# Patient Record
Sex: FEMALE | Race: WHITE | NOT HISPANIC OR LATINO | Employment: OTHER | URBAN - METROPOLITAN AREA
[De-identification: names, ages, dates, MRNs, and addresses within clinical notes are randomized per-mention and may not be internally consistent; named-entity substitution may affect disease eponyms.]

---

## 2018-01-22 ENCOUNTER — HOSPITAL ENCOUNTER (OUTPATIENT)
Dept: LAB | Facility: CLINIC | Age: 68
Discharge: HOME | End: 2018-01-22
Attending: ORTHOPAEDIC SURGERY | Admitting: ORTHOPAEDIC SURGERY

## 2018-01-22 DIAGNOSIS — Z01.812 PRE-OPERATIVE LABORATORY EXAMINATION: ICD-10-CM

## 2018-01-22 LAB
MRSA DNA SPEC QL NAA+PROBE: NEGATIVE
SPECIMEN SOURCE: NORMAL

## 2018-01-22 PROCEDURE — 40000829 ZZHCL STATISTIC STAPH AUREUS SUSCEPT SCREEN PCR: Performed by: ORTHOPAEDIC SURGERY

## 2018-01-22 PROCEDURE — 87640 STAPH A DNA AMP PROBE: CPT | Performed by: ORTHOPAEDIC SURGERY

## 2018-01-22 PROCEDURE — 87641 MR-STAPH DNA AMP PROBE: CPT | Performed by: ORTHOPAEDIC SURGERY

## 2018-01-22 PROCEDURE — 40000830 ZZHCL STATISTIC STAPH AUREUS METH RESIST SCREEN PCR: Performed by: ORTHOPAEDIC SURGERY

## 2018-01-24 ENCOUNTER — TRANSFERRED RECORDS (OUTPATIENT)
Dept: HEALTH INFORMATION MANAGEMENT | Facility: CLINIC | Age: 68
End: 2018-01-24

## 2018-02-15 RX ORDER — LISINOPRIL/HYDROCHLOROTHIAZIDE 10-12.5 MG
1 TABLET ORAL DAILY
COMMUNITY
End: 2023-02-10

## 2018-02-20 ENCOUNTER — HOSPITAL ENCOUNTER (INPATIENT)
Facility: CLINIC | Age: 68
LOS: 1 days | Discharge: HOME | DRG: 470 | End: 2018-02-21
Attending: ORTHOPAEDIC SURGERY | Admitting: ORTHOPAEDIC SURGERY
Payer: MEDICARE

## 2018-02-20 ENCOUNTER — ANESTHESIA (OUTPATIENT)
Dept: SURGERY | Facility: CLINIC | Age: 68
DRG: 470 | End: 2018-02-20
Payer: MEDICARE

## 2018-02-20 ENCOUNTER — ANESTHESIA EVENT (OUTPATIENT)
Dept: SURGERY | Facility: CLINIC | Age: 68
DRG: 470 | End: 2018-02-20
Payer: MEDICARE

## 2018-02-20 ENCOUNTER — APPOINTMENT (OUTPATIENT)
Dept: GENERAL RADIOLOGY | Facility: CLINIC | Age: 68
DRG: 470 | End: 2018-02-20
Attending: ORTHOPAEDIC SURGERY
Payer: MEDICARE

## 2018-02-20 DIAGNOSIS — Z96.651 STATUS POST TOTAL KNEE REPLACEMENT, RIGHT: Primary | ICD-10-CM

## 2018-02-20 LAB
CREAT SERPL-MCNC: 0.9 MG/DL (ref 0.52–1.04)
GFR SERPL CREATININE-BSD FRML MDRD: 62 ML/MIN/1.7M2
HGB BLD-MCNC: 12.2 G/DL (ref 11.7–15.7)
PLATELET # BLD AUTO: 195 10E9/L (ref 150–450)
POTASSIUM SERPL-SCNC: 3.6 MMOL/L (ref 3.4–5.3)

## 2018-02-20 PROCEDURE — 85049 AUTOMATED PLATELET COUNT: CPT | Performed by: PHYSICIAN ASSISTANT

## 2018-02-20 PROCEDURE — 40000986 XR KNEE PORT RT 1/2 VW: Mod: RT

## 2018-02-20 PROCEDURE — 40000170 ZZH STATISTIC PRE-PROCEDURE ASSESSMENT II: Performed by: ORTHOPAEDIC SURGERY

## 2018-02-20 PROCEDURE — A9270 NON-COVERED ITEM OR SERVICE: HCPCS | Mod: GY | Performed by: PHYSICIAN ASSISTANT

## 2018-02-20 PROCEDURE — 84132 ASSAY OF SERUM POTASSIUM: CPT | Performed by: PHYSICIAN ASSISTANT

## 2018-02-20 PROCEDURE — 71000012 ZZH RECOVERY PHASE 1 LEVEL 1 FIRST HR: Performed by: ORTHOPAEDIC SURGERY

## 2018-02-20 PROCEDURE — 25000128 H RX IP 250 OP 636: Performed by: ANESTHESIOLOGY

## 2018-02-20 PROCEDURE — 36000063 ZZH SURGERY LEVEL 4 EA 15 ADDTL MIN: Performed by: ORTHOPAEDIC SURGERY

## 2018-02-20 PROCEDURE — 85018 HEMOGLOBIN: CPT | Performed by: PHYSICIAN ASSISTANT

## 2018-02-20 PROCEDURE — 25000132 ZZH RX MED GY IP 250 OP 250 PS 637: Mod: GY | Performed by: PHYSICIAN ASSISTANT

## 2018-02-20 PROCEDURE — 25000128 H RX IP 250 OP 636: Performed by: NURSE ANESTHETIST, CERTIFIED REGISTERED

## 2018-02-20 PROCEDURE — 25000128 H RX IP 250 OP 636: Performed by: ORTHOPAEDIC SURGERY

## 2018-02-20 PROCEDURE — 99207 ZZC CONSULT E&M CHANGED TO SUBSEQUENT LEVEL: CPT | Performed by: NURSE PRACTITIONER

## 2018-02-20 PROCEDURE — C1776 JOINT DEVICE (IMPLANTABLE): HCPCS | Performed by: ORTHOPAEDIC SURGERY

## 2018-02-20 PROCEDURE — 25000132 ZZH RX MED GY IP 250 OP 250 PS 637: Mod: GY | Performed by: NURSE PRACTITIONER

## 2018-02-20 PROCEDURE — 37000009 ZZH ANESTHESIA TECHNICAL FEE, EACH ADDTL 15 MIN: Performed by: ORTHOPAEDIC SURGERY

## 2018-02-20 PROCEDURE — A9270 NON-COVERED ITEM OR SERVICE: HCPCS | Mod: GY | Performed by: NURSE PRACTITIONER

## 2018-02-20 PROCEDURE — 25000125 ZZHC RX 250: Performed by: ORTHOPAEDIC SURGERY

## 2018-02-20 PROCEDURE — 37000008 ZZH ANESTHESIA TECHNICAL FEE, 1ST 30 MIN: Performed by: ORTHOPAEDIC SURGERY

## 2018-02-20 PROCEDURE — 25000125 ZZHC RX 250: Performed by: ANESTHESIOLOGY

## 2018-02-20 PROCEDURE — 27210794 ZZH OR GENERAL SUPPLY STERILE: Performed by: ORTHOPAEDIC SURGERY

## 2018-02-20 PROCEDURE — 82565 ASSAY OF CREATININE: CPT | Performed by: PHYSICIAN ASSISTANT

## 2018-02-20 PROCEDURE — 36415 COLL VENOUS BLD VENIPUNCTURE: CPT | Performed by: PHYSICIAN ASSISTANT

## 2018-02-20 PROCEDURE — 0SRC0J9 REPLACEMENT OF RIGHT KNEE JOINT WITH SYNTHETIC SUBSTITUTE, CEMENTED, OPEN APPROACH: ICD-10-PCS | Performed by: ORTHOPAEDIC SURGERY

## 2018-02-20 PROCEDURE — 99232 SBSQ HOSP IP/OBS MODERATE 35: CPT | Performed by: NURSE PRACTITIONER

## 2018-02-20 PROCEDURE — 25000125 ZZHC RX 250: Performed by: NURSE ANESTHETIST, CERTIFIED REGISTERED

## 2018-02-20 PROCEDURE — 25000125 ZZHC RX 250: Performed by: PHYSICIAN ASSISTANT

## 2018-02-20 PROCEDURE — 25000132 ZZH RX MED GY IP 250 OP 250 PS 637: Mod: GY | Performed by: ORTHOPAEDIC SURGERY

## 2018-02-20 PROCEDURE — 12000007 ZZH R&B INTERMEDIATE

## 2018-02-20 PROCEDURE — 25800025 ZZH RX 258: Performed by: ORTHOPAEDIC SURGERY

## 2018-02-20 PROCEDURE — 25000128 H RX IP 250 OP 636: Performed by: PHYSICIAN ASSISTANT

## 2018-02-20 PROCEDURE — 27210995 ZZH RX 272: Performed by: ORTHOPAEDIC SURGERY

## 2018-02-20 PROCEDURE — 36000093 ZZH SURGERY LEVEL 4 1ST 30 MIN: Performed by: ORTHOPAEDIC SURGERY

## 2018-02-20 PROCEDURE — A9270 NON-COVERED ITEM OR SERVICE: HCPCS | Mod: GY | Performed by: ORTHOPAEDIC SURGERY

## 2018-02-20 PROCEDURE — 27810169 ZZH OR IMPLANT GENERAL: Performed by: ORTHOPAEDIC SURGERY

## 2018-02-20 DEVICE — BONE CEMENT RADIOPAQUE SIMPLEX HV FULL DOSE 6194-1-001: Type: IMPLANTABLE DEVICE | Site: KNEE | Status: FUNCTIONAL

## 2018-02-20 DEVICE — IMP BONE CEMENT SIMPLEX W/GENTAMICIN 6195-1-001: Type: IMPLANTABLE DEVICE | Site: KNEE | Status: FUNCTIONAL

## 2018-02-20 DEVICE — IMPLANTABLE DEVICE: Type: IMPLANTABLE DEVICE | Site: KNEE | Status: FUNCTIONAL

## 2018-02-20 DEVICE — IMP COMP FEM JJ ATTUNE POST STAB RT NRW CEM SZ5 150410225: Type: IMPLANTABLE DEVICE | Site: KNEE | Status: FUNCTIONAL

## 2018-02-20 RX ORDER — HYDROMORPHONE HYDROCHLORIDE 1 MG/ML
.3-.5 INJECTION, SOLUTION INTRAMUSCULAR; INTRAVENOUS; SUBCUTANEOUS EVERY 5 MIN PRN
Status: DISCONTINUED | OUTPATIENT
Start: 2018-02-20 | End: 2018-02-20 | Stop reason: HOSPADM

## 2018-02-20 RX ORDER — CEFAZOLIN SODIUM 1 G
1 VIAL (EA) INJECTION EVERY 8 HOURS
Status: COMPLETED | OUTPATIENT
Start: 2018-02-20 | End: 2018-02-21

## 2018-02-20 RX ORDER — CEFAZOLIN SODIUM 1 G
1 VIAL (EA) INJECTION SEE ADMIN INSTRUCTIONS
Status: DISCONTINUED | OUTPATIENT
Start: 2018-02-20 | End: 2018-02-20 | Stop reason: HOSPADM

## 2018-02-20 RX ORDER — ONDANSETRON 2 MG/ML
INJECTION INTRAMUSCULAR; INTRAVENOUS PRN
Status: DISCONTINUED | OUTPATIENT
Start: 2018-02-20 | End: 2018-02-20

## 2018-02-20 RX ORDER — KETOROLAC TROMETHAMINE 15 MG/ML
15 INJECTION, SOLUTION INTRAMUSCULAR; INTRAVENOUS EVERY 6 HOURS
Status: COMPLETED | OUTPATIENT
Start: 2018-02-20 | End: 2018-02-21

## 2018-02-20 RX ORDER — MAGNESIUM OXIDE 400 MG/1
400 TABLET ORAL 2 TIMES DAILY
Status: DISCONTINUED | OUTPATIENT
Start: 2018-02-20 | End: 2018-02-21 | Stop reason: HOSPADM

## 2018-02-20 RX ORDER — BUPIVACAINE HYDROCHLORIDE 7.5 MG/ML
INJECTION, SOLUTION INTRASPINAL PRN
Status: DISCONTINUED | OUTPATIENT
Start: 2018-02-20 | End: 2018-02-20

## 2018-02-20 RX ORDER — CEFAZOLIN SODIUM 2 G/100ML
2 INJECTION, SOLUTION INTRAVENOUS
Status: COMPLETED | OUTPATIENT
Start: 2018-02-20 | End: 2018-02-20

## 2018-02-20 RX ORDER — ACETAMINOPHEN 325 MG/1
975 TABLET ORAL EVERY 8 HOURS
Status: DISCONTINUED | OUTPATIENT
Start: 2018-02-20 | End: 2018-02-21 | Stop reason: HOSPADM

## 2018-02-20 RX ORDER — LISINOPRIL 10 MG/1
10 TABLET ORAL DAILY
Status: DISCONTINUED | OUTPATIENT
Start: 2018-02-21 | End: 2018-02-21 | Stop reason: HOSPADM

## 2018-02-20 RX ORDER — AMOXICILLIN 250 MG
1 CAPSULE ORAL 2 TIMES DAILY PRN
Status: DISCONTINUED | OUTPATIENT
Start: 2018-02-20 | End: 2018-02-21 | Stop reason: HOSPADM

## 2018-02-20 RX ORDER — ONDANSETRON 4 MG/1
4 TABLET, ORALLY DISINTEGRATING ORAL EVERY 30 MIN PRN
Status: DISCONTINUED | OUTPATIENT
Start: 2018-02-20 | End: 2018-02-20 | Stop reason: HOSPADM

## 2018-02-20 RX ORDER — GABAPENTIN 300 MG/1
300 CAPSULE ORAL EVERY EVENING
Status: DISCONTINUED | OUTPATIENT
Start: 2018-02-20 | End: 2018-02-21 | Stop reason: HOSPADM

## 2018-02-20 RX ORDER — TEMAZEPAM 7.5 MG/1
7.5 CAPSULE ORAL
Status: DISCONTINUED | OUTPATIENT
Start: 2018-02-21 | End: 2018-02-21 | Stop reason: HOSPADM

## 2018-02-20 RX ORDER — DEXTROSE MONOHYDRATE, SODIUM CHLORIDE, AND POTASSIUM CHLORIDE 50; 1.49; 4.5 G/1000ML; G/1000ML; G/1000ML
INJECTION, SOLUTION INTRAVENOUS CONTINUOUS
Status: DISCONTINUED | OUTPATIENT
Start: 2018-02-20 | End: 2018-02-20

## 2018-02-20 RX ORDER — SODIUM CHLORIDE, SODIUM LACTATE, POTASSIUM CHLORIDE, CALCIUM CHLORIDE 600; 310; 30; 20 MG/100ML; MG/100ML; MG/100ML; MG/100ML
INJECTION, SOLUTION INTRAVENOUS CONTINUOUS
Status: DISCONTINUED | OUTPATIENT
Start: 2018-02-20 | End: 2018-02-20 | Stop reason: HOSPADM

## 2018-02-20 RX ORDER — ASCORBIC ACID 500 MG
1000 TABLET ORAL 2 TIMES DAILY
Status: DISCONTINUED | OUTPATIENT
Start: 2018-02-20 | End: 2018-02-21 | Stop reason: HOSPADM

## 2018-02-20 RX ORDER — LIDOCAINE 40 MG/G
CREAM TOPICAL
Status: DISCONTINUED | OUTPATIENT
Start: 2018-02-20 | End: 2018-02-21 | Stop reason: HOSPADM

## 2018-02-20 RX ORDER — AMOXICILLIN 250 MG
2 CAPSULE ORAL 2 TIMES DAILY PRN
Status: DISCONTINUED | OUTPATIENT
Start: 2018-02-20 | End: 2018-02-21 | Stop reason: HOSPADM

## 2018-02-20 RX ORDER — HYDROXYZINE HYDROCHLORIDE 10 MG/1
10 TABLET, FILM COATED ORAL EVERY 6 HOURS PRN
Status: DISCONTINUED | OUTPATIENT
Start: 2018-02-20 | End: 2018-02-21 | Stop reason: HOSPADM

## 2018-02-20 RX ORDER — SCOLOPAMINE TRANSDERMAL SYSTEM 1 MG/1
1 PATCH, EXTENDED RELEASE TRANSDERMAL ONCE
Status: COMPLETED | OUTPATIENT
Start: 2018-02-20 | End: 2018-02-20

## 2018-02-20 RX ORDER — LIDOCAINE HYDROCHLORIDE 20 MG/ML
INJECTION, SOLUTION INFILTRATION; PERINEURAL PRN
Status: DISCONTINUED | OUTPATIENT
Start: 2018-02-20 | End: 2018-02-20

## 2018-02-20 RX ORDER — ACETAMINOPHEN 325 MG/1
650 TABLET ORAL EVERY 4 HOURS PRN
Status: DISCONTINUED | OUTPATIENT
Start: 2018-02-23 | End: 2018-02-21 | Stop reason: HOSPADM

## 2018-02-20 RX ORDER — FENTANYL CITRATE 50 UG/ML
INJECTION, SOLUTION INTRAMUSCULAR; INTRAVENOUS PRN
Status: DISCONTINUED | OUTPATIENT
Start: 2018-02-20 | End: 2018-02-20

## 2018-02-20 RX ORDER — ACETAMINOPHEN 500 MG
1000 TABLET ORAL ONCE
Status: COMPLETED | OUTPATIENT
Start: 2018-02-20 | End: 2018-02-20

## 2018-02-20 RX ORDER — PROCHLORPERAZINE MALEATE 5 MG
5 TABLET ORAL EVERY 6 HOURS PRN
Status: DISCONTINUED | OUTPATIENT
Start: 2018-02-20 | End: 2018-02-21 | Stop reason: HOSPADM

## 2018-02-20 RX ORDER — NALOXONE HYDROCHLORIDE 0.4 MG/ML
.1-.4 INJECTION, SOLUTION INTRAMUSCULAR; INTRAVENOUS; SUBCUTANEOUS
Status: DISCONTINUED | OUTPATIENT
Start: 2018-02-20 | End: 2018-02-21 | Stop reason: HOSPADM

## 2018-02-20 RX ORDER — ONDANSETRON 2 MG/ML
4 INJECTION INTRAMUSCULAR; INTRAVENOUS EVERY 6 HOURS PRN
Status: DISCONTINUED | OUTPATIENT
Start: 2018-02-20 | End: 2018-02-21 | Stop reason: HOSPADM

## 2018-02-20 RX ORDER — BUPIVACAINE HYDROCHLORIDE AND EPINEPHRINE 5; 5 MG/ML; UG/ML
INJECTION, SOLUTION PERINEURAL PRN
Status: DISCONTINUED | OUTPATIENT
Start: 2018-02-20 | End: 2018-02-20 | Stop reason: HOSPADM

## 2018-02-20 RX ORDER — PROPOFOL 10 MG/ML
INJECTION, EMULSION INTRAVENOUS CONTINUOUS PRN
Status: DISCONTINUED | OUTPATIENT
Start: 2018-02-20 | End: 2018-02-20

## 2018-02-20 RX ORDER — FENTANYL CITRATE 50 UG/ML
25-50 INJECTION, SOLUTION INTRAMUSCULAR; INTRAVENOUS
Status: DISCONTINUED | OUTPATIENT
Start: 2018-02-20 | End: 2018-02-20 | Stop reason: HOSPADM

## 2018-02-20 RX ORDER — KETOROLAC TROMETHAMINE 30 MG/ML
INJECTION, SOLUTION INTRAMUSCULAR; INTRAVENOUS PRN
Status: DISCONTINUED | OUTPATIENT
Start: 2018-02-20 | End: 2018-02-20 | Stop reason: HOSPADM

## 2018-02-20 RX ORDER — ONDANSETRON 2 MG/ML
4 INJECTION INTRAMUSCULAR; INTRAVENOUS EVERY 30 MIN PRN
Status: DISCONTINUED | OUTPATIENT
Start: 2018-02-20 | End: 2018-02-20 | Stop reason: HOSPADM

## 2018-02-20 RX ORDER — HYDROMORPHONE HYDROCHLORIDE 2 MG/1
2-4 TABLET ORAL
Status: DISCONTINUED | OUTPATIENT
Start: 2018-02-20 | End: 2018-02-21 | Stop reason: HOSPADM

## 2018-02-20 RX ORDER — ONDANSETRON 4 MG/1
4 TABLET, ORALLY DISINTEGRATING ORAL EVERY 6 HOURS PRN
Status: DISCONTINUED | OUTPATIENT
Start: 2018-02-20 | End: 2018-02-21 | Stop reason: HOSPADM

## 2018-02-20 RX ORDER — HYDROMORPHONE HYDROCHLORIDE 1 MG/ML
.3-.5 INJECTION, SOLUTION INTRAMUSCULAR; INTRAVENOUS; SUBCUTANEOUS
Status: DISCONTINUED | OUTPATIENT
Start: 2018-02-20 | End: 2018-02-21 | Stop reason: HOSPADM

## 2018-02-20 RX ADMIN — SCOPALAMINE 1 PATCH: 1 PATCH, EXTENDED RELEASE TRANSDERMAL at 11:02

## 2018-02-20 RX ADMIN — HYDROMORPHONE HYDROCHLORIDE 4 MG: 2 TABLET ORAL at 20:48

## 2018-02-20 RX ADMIN — Medication 400 MG: at 20:48

## 2018-02-20 RX ADMIN — ROPIVACAINE HYDROCHLORIDE 25 ML GIVEN: 5 INJECTION, SOLUTION EPIDURAL; INFILTRATION; PERINEURAL at 11:03

## 2018-02-20 RX ADMIN — CEFAZOLIN SODIUM 1 G: 2 INJECTION, SOLUTION INTRAVENOUS at 13:45

## 2018-02-20 RX ADMIN — TRANEXAMIC ACID 1 G: 100 INJECTION, SOLUTION INTRAVENOUS at 11:50

## 2018-02-20 RX ADMIN — HYDROMORPHONE HYDROCHLORIDE 0.5 MG: 1 INJECTION, SOLUTION INTRAMUSCULAR; INTRAVENOUS; SUBCUTANEOUS at 19:54

## 2018-02-20 RX ADMIN — BUPIVACAINE HYDROCHLORIDE IN DEXTROSE 12 MG: 7.5 INJECTION, SOLUTION SUBARACHNOID at 11:37

## 2018-02-20 RX ADMIN — PHENYLEPHRINE HYDROCHLORIDE 100 MCG: 10 INJECTION INTRAVENOUS at 11:56

## 2018-02-20 RX ADMIN — ONDANSETRON 4 MG: 2 INJECTION INTRAMUSCULAR; INTRAVENOUS at 14:00

## 2018-02-20 RX ADMIN — PROPOFOL 100 MCG/KG/MIN: 10 INJECTION, EMULSION INTRAVENOUS at 11:36

## 2018-02-20 RX ADMIN — SODIUM CHLORIDE, POTASSIUM CHLORIDE, SODIUM LACTATE AND CALCIUM CHLORIDE: 600; 310; 30; 20 INJECTION, SOLUTION INTRAVENOUS at 10:43

## 2018-02-20 RX ADMIN — OXYCODONE HYDROCHLORIDE AND ACETAMINOPHEN 1000 MG: 500 TABLET ORAL at 20:48

## 2018-02-20 RX ADMIN — KETOROLAC TROMETHAMINE 15 MG: 15 INJECTION, SOLUTION INTRAMUSCULAR; INTRAVENOUS at 20:48

## 2018-02-20 RX ADMIN — PHENYLEPHRINE HYDROCHLORIDE 100 MCG: 10 INJECTION INTRAVENOUS at 12:34

## 2018-02-20 RX ADMIN — MIDAZOLAM 1 MG: 1 INJECTION INTRAMUSCULAR; INTRAVENOUS at 11:34

## 2018-02-20 RX ADMIN — FENTANYL CITRATE 25 MCG: 50 INJECTION, SOLUTION INTRAMUSCULAR; INTRAVENOUS at 11:34

## 2018-02-20 RX ADMIN — ACETAMINOPHEN 975 MG: 325 TABLET, FILM COATED ORAL at 17:34

## 2018-02-20 RX ADMIN — PHENYLEPHRINE HYDROCHLORIDE 100 MCG: 10 INJECTION INTRAVENOUS at 12:42

## 2018-02-20 RX ADMIN — PHENYLEPHRINE HYDROCHLORIDE 100 MCG: 10 INJECTION INTRAVENOUS at 13:02

## 2018-02-20 RX ADMIN — HYDROMORPHONE HYDROCHLORIDE 0.5 MG: 1 INJECTION, SOLUTION INTRAMUSCULAR; INTRAVENOUS; SUBCUTANEOUS at 17:34

## 2018-02-20 RX ADMIN — CEFAZOLIN SODIUM 2 G: 2 INJECTION, SOLUTION INTRAVENOUS at 11:45

## 2018-02-20 RX ADMIN — MIDAZOLAM 1 MG: 1 INJECTION INTRAMUSCULAR; INTRAVENOUS at 11:40

## 2018-02-20 RX ADMIN — TRANEXAMIC ACID 1 G: 100 INJECTION, SOLUTION INTRAVENOUS at 13:34

## 2018-02-20 RX ADMIN — PHENYLEPHRINE HYDROCHLORIDE 100 MCG: 10 INJECTION INTRAVENOUS at 13:19

## 2018-02-20 RX ADMIN — MIDAZOLAM HYDROCHLORIDE 1 MG: 1 INJECTION, SOLUTION INTRAMUSCULAR; INTRAVENOUS at 11:01

## 2018-02-20 RX ADMIN — GABAPENTIN 300 MG: 300 CAPSULE ORAL at 19:54

## 2018-02-20 RX ADMIN — CEFAZOLIN SODIUM 1 G: 10 INJECTION, POWDER, FOR SOLUTION INTRAVENOUS at 21:05

## 2018-02-20 RX ADMIN — ACETAMINOPHEN 1000 MG: 500 TABLET, FILM COATED ORAL at 10:43

## 2018-02-20 RX ADMIN — LIDOCAINE HYDROCHLORIDE 40 MG: 20 INJECTION, SOLUTION INFILTRATION; PERINEURAL at 11:36

## 2018-02-20 RX ADMIN — PHENYLEPHRINE HYDROCHLORIDE 100 MCG: 10 INJECTION INTRAVENOUS at 12:22

## 2018-02-20 RX ADMIN — PHENYLEPHRINE HYDROCHLORIDE 100 MCG: 10 INJECTION INTRAVENOUS at 12:14

## 2018-02-20 RX ADMIN — KETOROLAC TROMETHAMINE 15 MG: 15 INJECTION, SOLUTION INTRAMUSCULAR; INTRAVENOUS at 14:59

## 2018-02-20 RX ADMIN — PHENYLEPHRINE HYDROCHLORIDE 100 MCG: 10 INJECTION INTRAVENOUS at 12:06

## 2018-02-20 ASSESSMENT — ACTIVITIES OF DAILY LIVING (ADL)
BATHING: 0-->INDEPENDENT
RETIRED_COMMUNICATION: 0-->UNDERSTANDS/COMMUNICATES WITHOUT DIFFICULTY
RETIRED_EATING: 0-->INDEPENDENT
TRANSFERRING: 0-->INDEPENDENT
SWALLOWING: 0-->SWALLOWS FOODS/LIQUIDS WITHOUT DIFFICULTY
COGNITION: 0 - NO COGNITION ISSUES REPORTED
DRESS: 0-->INDEPENDENT
BATHING: 0 - INDEPENDENT
CURRENT_FUNCTIONAL_LEVEL_COMMENT: INDEPENDENT
DRESS: 0 - INDEPENDENT
EATING: 0 - INDEPENDENT
TOILETING: 0 - INDEPENDENT
CHANGE_IN_FUNCTIONAL_STATUS_SINCE_ONSET_OF_CURRENT_ILLNESS/INJURY: YES
AMBULATION: 0-->INDEPENDENT
AMBULATION: 0 - INDEPENDENT
TOILETING: 0-->INDEPENDENT
FALL_HISTORY_WITHIN_LAST_SIX_MONTHS: NO
SWALLOWING: 0 - SWALLOWS FOODS/LIQUIDS WITHOUT DIFFICULTY
TRANSFERRING: 0 - INDEPENDENT
COMMUNICATION: 0 - UNDERSTANDS/COMMUNICATES WITHOUT DIFFICULTY

## 2018-02-20 NOTE — ANESTHESIA CARE TRANSFER NOTE
Patient: Shahrzad Myles    Procedure(s):  RIGHT TOTAL KNEE ARTHROPLASTY  - Wound Class: I-Clean    Diagnosis: RIGHT KNEE DJD   Diagnosis Additional Information: No value filed.    Anesthesia Type:   Spinal, Periph. Nerve Block for postop pain     Note:  Airway :Nasal Cannula  Patient transferred to:PACU  Comments: VSS. Airway and IV patent. Patient comfortable. Report to RN. Stable care transfer.Handoff Report: Identifed the Patient, Identified the Reponsible Provider, Reviewed the pertinent medical history, Discussed the surgical course, Reviewed Intra-OP anesthesia mangement and issues during anesthesia, Set expectations for post-procedure period and Allowed opportunity for questions and acknowledgement of understanding      Vitals: (Last set prior to Anesthesia Care Transfer)    CRNA VITALS  2/20/2018 1336 - 2/20/2018 1411      2/20/2018             Resp Rate (set): 10                Electronically Signed By: NATALIE Valle CRNA  February 20, 2018  2:11 PM

## 2018-02-20 NOTE — ANESTHESIA PREPROCEDURE EVALUATION
Anesthesia Evaluation     .             ROS/MED HX    ENT/Pulmonary:     (+)sleep apnea, uses CPAP , . .    Neurologic:     (+)migraines,     Cardiovascular:     (+) hypertension----. : . . . :. .       METS/Exercise Tolerance:     Hematologic:         Musculoskeletal:   (+) , , other musculoskeletal- TMJ;      GI/Hepatic:        (-) GERD   Renal/Genitourinary:         Endo:         Psychiatric:     (+) psychiatric history anxiety      Infectious Disease:         Malignancy:         Other:                     Physical Exam  Normal systems: cardiovascular, pulmonary and dental    Airway   Mallampati: II  TM distance: >3 FB  Neck ROM: full    Dental     Cardiovascular   Rhythm and rate: regular      Pulmonary    breath sounds clear to auscultation                    Anesthesia Plan      History & Physical Review  History and physical reviewed and following examination; no interval change.    ASA Status:  2 .    NPO Status:  > 8 hours    Plan for Spinal and Periph. Nerve Block for postop pain   PONV prophylaxis:  Ondansetron (or other 5HT-3) and Scopolamine patch       Postoperative Care  Postoperative pain management:  IV analgesics and Peripheral nerve block (Single Shot).      Consents  Anesthetic plan, risks, benefits and alternatives discussed with:  Patient..                          .

## 2018-02-20 NOTE — PROGRESS NOTES
Admission medication history interview status for the 2/20/2018  admission is complete. See EPIC admission navigator for prior to admission medications     Medication history source reliability:Good    Medication history interview source(s):Patient    Medication history resources (including written lists, pill bottles, clinic record): written list    Primary pharmacy. [facility]    Additional medication history information not noted on PTA med list : Pt brought in own compounded meds and eye drops    Time spent in this activity: 45 mins    Prior to Admission medications    Medication Sig Last Dose Taking? Auth Provider   OVER-THE-COUNTER Place 2 drops into both eyes every morning (Replens eye drops) 2/20/2018 at 0745 Yes Reported, Patient   lisinopril-hydrochlorothiazide (PRINZIDE/ZESTORETIC) 10-12.5 MG per tablet Take 1 tablet by mouth daily 2/19/2018 at 0900 Yes Reported, Patient   GABAPENTIN PO Take 300 mg by mouth every evening 2/19/2018 at 2100 Yes Reported, Patient   NONFORMULARY Take 300 mg by mouth every evening Compounded Progesterone  (3 x 100mg tablet = 300mg dose) 2/19/2018 at 2100 Yes Reported, Patient   NONFORMULARY Apply 0.125 teaspoonful topically every evening Compounded Estradiol Cream 4mg/GM 2/19/2018 at 2100 Yes Reported, Patient   IBUPROFEN PO Take 800 mg by mouth 3 times daily 2/12/2018 Yes Reported, Patient   Ascorbic Acid (VITAMIN C PO) Take 1,000 mg by mouth 2 times daily 2/19/2018 at 2100 Yes Reported, Patient   MAGNESIUM PO Take 1,500 mg by mouth every morning (Takes 3 x 500mg tablet = 1500mg dose) 2/19/2018 at 0900 Yes Reported, Patient   TURMERIC PO Take 500 mg by mouth 2 times daily 2/6/2018 Yes Reported, Patient   GLUCOSAMINE-CHONDROITIN PO Take 1 tablet by mouth 2 times daily 2/12/2018 Yes Reported, Patient   CRANBERRY PO Take 1 tablet by mouth every morning 2/19/2018 at 0900 Yes Reported, Patient   Probiotic Product (ALIGN PO) Take 1 tablet by mouth every morning  2/19/2018 at 0900 Yes Reported, Patient   Cyanocobalamin (VITAMIN B-12 PO) Take 5,000 mcg by mouth every morning 2/19/2018 at 0900 Yes Reported, Patient   Multiple Vitamins-Minerals (MULTIVITAMIN PO) Take 1 tablet by mouth every morning 2/19/2018 at 0900 Yes Reported, Patient   conjugated estrogens (PREMARIN) cream Place vaginally every evening 2/19/2018 at 2100 Yes Reported, Patient   GTF CHROMIUM PO Take 1,000 mcg by mouth daily (Takes 5 x 200mcg tablet = 1000mcg dose) 2/19/2018 at 0900 Yes Reported, Patient   Omega-3 Fatty Acids (FISH OIL PO) Take 2 capsules by mouth daily 2/12/2018 Yes Reported, Patient

## 2018-02-20 NOTE — BRIEF OP NOTE
[facility] Brief Operative Note    Pre-operative diagnosis: RIGHT KNEE DJD    Post-operative diagnosis same   Procedure: Procedure(s):  RIGHT TOTAL KNEE ARTHROPLASTY  - Wound Class: I-Clean   Surgeon(s): Surgeon(s) and Role:     * Jatinder Mcfarland MD - Primary     * Padmini Harper PA-C   Estimated blood loss: 100mL    Specimens: * No specimens in log *   Findings: Advanced OA     Plan: DC home pod2 w/daughter.  DVT prophylaxis w/lovenox in hospital, DC home on ASA 325mg BID x1mo.

## 2018-02-20 NOTE — IP AVS SNAPSHOT
[address] [facility]    [address] [place] [address]    Phone:  [phone]                                       After Visit Summary   2/20/2018    Shahrzad Myles    MRN: [id number]           After Visit Summary Signature Page     I have received my discharge instructions, and my questions have been answered. I have discussed any challenges I see with this plan with the nurse or doctor.    ..........................................................................................................................................  Patient/Patient Representative Signature      ..........................................................................................................................................  Patient Representative Print Name and Relationship to Patient    ..................................................               ................................................  Date                                            Time    ..........................................................................................................................................  Reviewed by Signature/Title    ...................................................              ..............................................  Date                                                            Time

## 2018-02-20 NOTE — CONSULTS
[facility]    Hospitalist Consultation    Date of Admission:  2/20/2018    Assessment & Plan   Shahrzad Myles is a 67 year old female with a past medical history of hypertension who was admitted on 2/20/2018 for a right total knee arthroplasty performed by Dr. Mcfarland under spinal and peripheral nerve block with an EBL of 100mL. The hospitalist service was asked to see the patient for medical comanagement.    Summary:   S/p right knee arthroplasty  -- continue routine post of care    HTN - patient is well managed on lisinopril-hctz  -- will hold the HCTZ  -- continue PTA 10mg lisinopril in the AM    Post menopausal - patient takes compounded estradiol/progesterone  -- continue PTA estradiol/progesterone, patient supplied medication    Constipation - patient takes magnesium and vitamin C at home for relief of constipation  -- continue PTA mag and vit C    Pain # Pain Assessment: Per primary team   Active Diet Order: Advance Diet as Tolerated: Regular Diet Adult  Fluids:  --> d/c'd as patient was eating when I examined her.  DVT Prophylaxis: Defer to primary service  Code Status: Full Code  Disposition: Expected discharge per primary team    This patient was discussed with KRYSTAL GOINS MD of the Hospitalist Service, who independently examined the patient. He is in agreement with the above plan.    We, of the Hospitalist Service, thank you for this consult. The patient is medically stable. Hospitalist Service will sign off. Please do not hesitate to call if additional concerns arise.    NATALIE Obrien, CNP  Text Page  (3pm to 11pm)  Reason for Consult   Reason for consult: I was asked by Jatinder Mcfarland MD to evaluate this patient for medical comanagement.    Primary Care Physician   Radha Granados    Chief Complaint   RKA    History is obtained from the patient, family, and medical record    History of Present Illness   Shahrzad Myles is a 67 year old female with a past medical  history of hypertension who was admitted on 2/20/2018 for a right total knee arthroplasty performed by Dr. Mcfarland under spinal and peripheral nerve block with an EBL of 100mL. The hospitalist service was asked to see the patient for medical comanagement.    Past Medical History    I have reviewed this patient's medical history and updated it with pertinent information if needed.   Past Medical History:   Diagnosis Date     HTN (hypertension)        Past Surgical History   I have reviewed this patient's surgical history and updated it with pertinent information if needed.  Past Surgical History:   Procedure Laterality Date     ARTHROPLASTY KNEE Left        Prior to Admission Medications   Prior to Admission Medications   Prescriptions Last Dose Informant Patient Reported? Taking?   Ascorbic Acid (VITAMIN C PO) 2/19/2018 at 2100 Self Yes Yes   Sig: Take 1,000 mg by mouth 2 times daily   CRANBERRY PO 2/19/2018 at 0900 Self Yes Yes   Sig: Take 1 tablet by mouth every morning   Cyanocobalamin (VITAMIN B-12 PO) 2/19/2018 at 0900 Self Yes Yes   Sig: Take 5,000 mcg by mouth every morning   GABAPENTIN PO 2/19/2018 at 2100 Self Yes Yes   Sig: Take 300 mg by mouth every evening   GLUCOSAMINE-CHONDROITIN PO 2/12/2018 Self Yes Yes   Sig: Take 1 tablet by mouth 2 times daily   GTF CHROMIUM PO 2/19/2018 at 0900 Self Yes Yes   Sig: Take 1,000 mcg by mouth daily (Takes 5 x 200mcg tablet = 1000mcg dose)   IBUPROFEN PO 2/12/2018 Self Yes Yes   Sig: Take 800 mg by mouth 3 times daily   MAGNESIUM PO 2/19/2018 at 0900 Self Yes Yes   Sig: Take 1,500 mg by mouth every morning (Takes 3 x 500mg tablet = 1500mg dose)   Multiple Vitamins-Minerals (MULTIVITAMIN PO) 2/19/2018 at 0900 Self Yes Yes   Sig: Take 1 tablet by mouth every morning   NONFORMULARY 2/19/2018 at 2100 Self Yes Yes   Sig: Take 300 mg by mouth every evening Compounded Progesterone  (3 x 100mg tablet = 300mg dose)   NONFORMULARY 2/19/2018 at 2100 Self Yes Yes   Sig: Apply 0.125  teaspoonful topically every evening Compounded Estradiol Cream 4mg/GM   OVER-THE-COUNTER 2/20/2018 at 0745 Self Yes Yes   Sig: Place 2 drops into both eyes every morning (Replens eye drops)   Omega-3 Fatty Acids (FISH OIL PO) 2/12/2018 Self Yes Yes   Sig: Take 2 capsules by mouth daily   Probiotic Product (ALIGN PO) 2/19/2018 at 0900 Self Yes Yes   Sig: Take 1 tablet by mouth every morning   TURMERIC PO 2/6/2018 Self Yes Yes   Sig: Take 500 mg by mouth 2 times daily   conjugated estrogens (PREMARIN) cream 2/19/2018 at 2100 Self Yes Yes   Sig: Place vaginally every evening   lisinopril-hydrochlorothiazide (PRINZIDE/ZESTORETIC) 10-12.5 MG per tablet 2/19/2018 at 0900 Self Yes Yes   Sig: Take 1 tablet by mouth daily      Facility-Administered Medications: None     Allergies   Allergies   Allergen Reactions     Codeine      Penicillin G      Sulfa Drugs      Tape [Adhesive Tape]        Social History   I have reviewed this patient's social history and updated it with pertinent information if needed. Shahrzad Myles  reports that she has never smoked. She has never used smokeless tobacco. She reports that she drinks alcohol. She reports that she does not use illicit drugs.    Family History   I have reviewed this patient's family history and updated it with pertinent information if needed.   Family History   Problem Relation Age of Onset     Hypertension Mother      Hyperlipidemia Mother      DIABETES Father      Heart Surgery Father      CEREBROVASCULAR DISEASE Father        Review of Systems   The 10 point Review of Systems is negative other than noted in the HPI or here.     Physical Exam   Temp: 98.6  F (37  C) Temp src: Oral BP: 126/67 Pulse: 66 Heart Rate: 65 Resp: 16 SpO2: 98 % O2 Device: None (Room air) Oxygen Delivery: 2 LPM  Vital Signs with Ranges  Temp:  [97  F (36.1  C)-98.6  F (37  C)] 98.6  F (37  C)  Pulse:  [64-66] 66  Heart Rate:  [49-76] 65  Resp:  [10-19] 16  BP: (116-151)/(58-74) 126/67  SpO2:  [97  %-99 %] 98 %  162 lbs 0 oz    Constitutional: Awake, alert, cooperative, no apparent distress.  HEENT: Moist mucous membranes, normal dentition, conjunctiva and pupils examined and normal.  Respiratory: Clear to auscultation bilaterally, no crackles or wheezing.  Cardiovascular: Regular rate and rhythm, normal S1 and S2, and no murmur noted.  GI/: Soft, non-distended, non-tender, normal bowel sounds. Clear yellow urine, no difficulty voiding  Lymph/Hematologic: No anterior cervical or supraclavicular adenopathy.  Skin: No rashes, no cyanosis, no edema.  Musculoskeletal: No joint swelling, erythema or tenderness.  Neurologic: Cranial nerves 2-12 intact, normal strength and sensation.  Psychiatric: Alert, oriented to person, place and time, no obvious anxiety or depression.    Data   -Data reviewed today: All pertinent laboratory and imaging results from this encounter were reviewed. I personally reviewed no images or EKG's today.    Recent Labs  Lab 02/20/18  1029   HGB 12.2   POTASSIUM 3.6   CR 0.90       Imaging:  Recent Results (from the past 24 hour(s))   XR Knee Port Right 1/2 Views    Narrative    RIGHT KNEE TWO VIEWS  2/20/2018 2:39 PM     HISTORY:  Status post total knee arthroplasty.     COMPARISON: None.      Impression    IMPRESSION: Right total knee replacement. Air remains in the joint  space. Alignment appears anatomic. No evidence of fracture.    XIANG BRANHAM MD

## 2018-02-20 NOTE — ANESTHESIA PROCEDURE NOTES
Peripheral nerve/Neuraxial procedure note : intrathecal  Pre-Procedure  Performed by AMENA WONG  Location: OR      Pre-Anesthestic Checklist: patient identified, IV checked, risks and benefits discussed, informed consent, monitors and equipment checked and pre-op evaluation    Timeout  Correct Patient: Yes   Correct Procedure: Yes   Correct Site: Yes   Correct Laterality: N/A   Correct Position: Yes   Site Marked: N/A   .   Procedure Documentation    .    Procedure:    Intrathecal.  Insertion Site:L4-5  (midline approach)      Patient Prep;povidone-iodine 7.5% surgical scrub.  .  Needle: Bridget tip Spinal Needle (gauge): 20  Spinal/LP Needle Length (inches): 3.5 # of attempts: 1 and # of redirects:  Introducer used Introducer: 20 G .       Assessment/Narrative  Paresthesias: No.  .  .  clear CSF fluid removed . Comments:  1% lidocaine for localization.  No complications.

## 2018-02-20 NOTE — ANESTHESIA POSTPROCEDURE EVALUATION
Patient: Shahrzad Myles    Procedure(s):  RIGHT TOTAL KNEE ARTHROPLASTY  - Wound Class: I-Clean    Diagnosis:RIGHT KNEE DJD   Diagnosis Additional Information: No value filed.    Anesthesia Type:  Spinal, Periph. Nerve Block for postop pain    Note:  Anesthesia Post Evaluation    Patient location during evaluation: PACU  Patient participation: Able to participate in evaluation but full recovery from regional anesthesia has not yet ocurrred but is anticipated to occur within 48 hours  Level of consciousness: awake  Pain management: adequate  Airway patency: patent  Cardiovascular status: acceptable  Respiratory status: acceptable  Hydration status: acceptable  PONV: none     Anesthetic complications: None          Last vitals:  Vitals:    02/20/18 1122 02/20/18 1410 02/20/18 1420   BP: 133/69 116/58 126/65   Resp: 16 17 12   Temp:  36.3  C (97.3  F)    SpO2: 98% 98% 99%         Electronically Signed By: AMENA WONG MD  February 20, 2018  2:34 PM

## 2018-02-20 NOTE — IP AVS SNAPSHOT
MRN:[id number]                      After Visit Summary   2/20/2018    Shahrzad Myles    MRN: [id number]           Thank you!     Thank you for choosing [facility] for your care. Our goal is always to provide you with excellent care. Hearing back from our patients is one way we can continue to improve our services. Please take a few minutes to complete the written survey that you may receive in the mail after you visit with us. Thank you!        Patient Information     Date Of Birth          1950        Designated Caregiver       Most Recent Value    Caregiver    Will someone help with your care after discharge? yes    Name of designated caregiver Kacey    Phone number of caregiver [phone]      About your hospital stay     You were admitted on:  February 20, 2018 You last received care in the:  [facility] [address] [facility]    You were discharged on:  February 21, 2018        Reason for your hospital stay       Total knee replacement                  Who to Call     For medical emergencies, please call 911.  For non-urgent questions about your medical care, please call your primary care provider or clinic, [phone]  For questions related to your surgery, please call your surgery clinic        Attending Provider     Provider Jatinder Maciel MD Orthopedics       Primary Care Provider Office Phone # Fax #    Radha DONOHUE Lewisgrace [phone]      After Care Instructions     Activity       Your activity upon discharge: activity as tolerated.  You should work on home exercises provided by the physical therapist at the hospital 2-3 times a day.     Physical Therapy: After leaving the hospital you should have appointments to see the physical therapist 2-3 times a week.  You should also work on your home exercises 2-3 times a day.  Riding a stationary bike, rocking the pedals back and forth, is the best exercise you can do after a knee replacement.      Assistive Ambulatory Devices  Use your walker/crutches until you are advanced to a cane with the assistance of the physical therapist.  You may discontinue the use of the cane when you feel comfortable.     Elevate: Keeping your leg elevated with a pillow under your calf, not under the knee, will help with the swelling.  The best position is to have the knee above the level of your heart and your ankle above the level of your knee. Wearing the compression stockings (Crispin hose) can help reduce swelling.  You should wear these until you are seen at Dr. Salas's office post operatively.  You can remove these twice a day for laundering and hygiene.  The swelling in the leg will be present for at least 8 weeks.     Ice: Ice the knee after physical therapy and 4-5 times a day for 20-30 minutes at a time.  Icing will help reduce swelling and pain.     Pain control: Take the pain medication and/or anti-inflammatory medication as prescribed.  Don't let your pain become severe.            Diet       Follow this diet upon discharge: Regular            Discharge Instructions       Upon leaving the hospital you will be discharged with a few different medications:     1. Aspirin 325mg - you will be taking one tablet twice a day for 4 weeks following surgery for a blood thinner to help prevent blood clots.     2. Tylenol 500mg - you should take 1-2 tablets every 8 hours for pain.  You should use this in addition to the pain medication.  This should be the last medication you stop taking for pain.  In other words, you should discontinue the use of the narcotic pain medication before stopping the Tylenol.   Do not exceed 4,000mg in a day.    3. Dilaudid 2mg - this is a pain medication.  You can take one or two tablets every four to six hours.  You will need this medication the longest to sleep at night and for physical therapy.  You can wean yourself from this medication as you are able by either increasing the time between  tablets or going down to one tablet if you are taking two at a time.    **REMINDER: If you need a refill of your pain medication prior to your first post-operative appointment please contact our office and allow 24 to 48 hours for refills to be processed. Any refills needed before the weekend will need to be submitted on Thursday.  Also, all narcotic pain medication cannot be called in to the pharmacy and will need to be picked up at one of our clinics ([facility] or [facility]), this is a Federal Law.  You or a family member will need to allow for time to come to our office to pick these up.  Please let us know who will be picking up the prescription as that person will need to show a photo ID to get the prescription.**    Other medications not prescribed:   1. Ibuprofen - we would like you to avoid taking ibuprofen while you are taking the aspirin.   2. Iron - we typically do not prescribe an iron supplement pill after surgery however, if you want to take one we recommend 324 or 325mg daily.  These pills will make you more constipated.     Please contact Dr. Salas's office with any questions.  You can either call Dr. Josue West's , at [phone] or email Dr. Josue Washington's physician assistant, at [email or web address].            Wound care and dressings       You have a gauze and tape dressing over the incision that you should leave in place for 2 days after leaving the hospital.  After this dressing is removed you do not need to keep a dressing over the incision.  There is a thin mesh film adhered to your skin over the incision which should stay in place until your follow-up appointment with Dr. Salas.  Do not remove this mesh.  If this comes off you should call Dr. Salas's office for further instruction.  You can get your incision wet in the shower but you should not submerge it.                  Follow-up Appointments     Follow-up and recommended labs and tests       Your follow-up  "appointment is schedule for 4:00pm on Tue 3/6 at the [place] office with Dr. Salas.  Please call [phone] if you need to reschedule this appointment.    If you have any questions or concerns you can call Dr. Salas's , Jenna, at [phone] or email Dr. Josue Washington's physician assistant at [email or web address]                  Further instructions from your care team       .TOTAL KNEE REPLACEMENT TAKE HOME INSTRUCTIONS  Your surgeon will answer any questions about your progress. General guidelines for your care are listed below. Your surgeon may give additional instructions for your care at home. Please follow them carefully    Activity Level  1. Physical activity may be resumed gradually according to your comfort level and your surgeon s instructions. Follow your exercise program as instructed by your therapist. Do exercises at least twice a day. you may ice your knee after exercising.  2. Complete exercises two hours before bedtime to minimize the effect pain may have on sleep.  Refer to pages 19-22 of you \"Total Knee Replacement\" booklet for details  3. Do not wear your knee immobilizer unless your doctor has specifically told you to continue it.    Good Health Practices  1. Maintain an adequate fluid intake and eat a well balanced diet.  2. Be sure to include the basic food groups, such as dairy products, meat/fish, vegetables, and fruit. Each of these foods contribute to your wound healing and increasing your strength.  3. Surgery, decreased activity and pain medication all contribute to a decrease in bowel activity that can result in constipation. It is recommended that you increase your liquid intake, add fiber to your diet, increase activity, and decrease pain medication use. If you have any problems, notify your physician.  4. Wear your anti-embolism stockings day and night until seen by your surgeon if you were issued these at discharge.  (Not all patients will have anti-embolism " "stockings) Remove twice a day for one hour at a time. You may hand wash and air dry your stockings.  5. Notify your dentist of your total knee surgery and call your dentist one week before a dental appointment for antibiotics, if your dentist will not prescribe antibiotics then call your surgeon to ask for next steps.      Incision/Dressing Care  1. Keep incision clean and dry per surgeons instructions  2. Cover incision if you are still having drainage.  3.  If you have a waterproof dressing __________________________   Shower.    Things to Watch For  1. Check incision daily for increased redness, tenderness, swelling, or drainage along the incision line. If these occur, please notify your doctor. Also, call if you develop a fever above 101 .  2. Please notify your doctor if you experience any calf pain and/or if you have surgical pain not relieved by the pain medication prescribed by your doctor.  Follow up appointment:______________________________  Nurse signature _________________________________ Date ________ Time _____  Patient signature _____________________________ Date _____________________        Revised 05/8/17      Pending Results     Date and Time Order Name Status Description    2/21/2018 0907 EKG 12-lead, tracing only Preliminary             Statement of Approval     Ordered          02/21/18 0949  I have reviewed and agree with all the recommendations and orders detailed in this document.  EFFECTIVE NOW     Approved and electronically signed by:  Padmini Harper PA-C             Admission Information     Date & Time Provider Department Dept. Phone    2/20/2018 Jatinder Mcfarland MD [facility] [address] [facility] [phone]      Your Vitals Were     Blood Pressure Pulse Temperature Respirations Height Weight    121/55 (BP Location: Left arm) 31 98.7  F (37.1  C) (Oral) 16 1.676 m (5' 6\") 73.5 kg (162 lb)    Pulse Oximetry BMI (Body Mass Index)                95% 26.15 kg/m2    " "      MyChart Information     [facility] lets you send messages to your doctor, view your test results, renew your prescriptions, schedule appointments and more. To sign up, go to [email or web address] . Click on \"Log in\" on the left side of the screen, which will take you to the Welcome page. Then click on \"Sign up Now\" on the right side of the page.     You will be asked to enter the access code listed below, as well as some personal information. Please follow the directions to create your username and password.     Your access code is: [email or web address]  Expires: 2018 10:27 AM     Your access code will  in 90 days. If you need help or a new code, please call your [facility] clinic or [phone].        Care EveryWhere ID     This is your Care EveryWhere ID. This could be used by other organizations to access your [facility] medical records  [id number]        Equal Access to Services     PANKAJ HAYES : Brook hernandez Soani, waboboda lusonya, qaybta kaalmada adeorquidea, nguyen alba . So [facility] [place] [facility] [phone].    ATENCIÓN: Si aisha simmons, tiene a yusuf disposición servicios gratuitos de asistencia lingüística. Llame al [phone].    We comply with applicable federal civil rights laws and [place] laws. We do not discriminate on the basis of race, color, national origin, age, disability, sex, sexual orientation, or gender identity.               Review of your medicines      START taking        Dose / Directions    HYDROmorphone 2 MG tablet   Commonly known as:  DILAUDID        Dose:  2-4 mg   Take 1-2 tablets (2-4 mg) by mouth every 4 hours as needed for moderate to severe pain or severe pain (take one tab for moderate pain, take two tabs for severe pain)   Quantity:  60 tablet   Refills:  0       hydrOXYzine 10 MG tablet   Commonly known as:  ATARAX        Dose:  10 mg   Take 1 tablet (10 mg) by mouth every 6 hours as needed (muscle spasm)   Quantity:  30 tablet   Refills:  0 "         CONTINUE these medicines which have NOT CHANGED        Dose / Directions    ALIGN PO        Dose:  1 tablet   Take 1 tablet by mouth every morning   Refills:  0       conjugated estrogens cream   Commonly known as:  PREMARIN        Place vaginally every evening   Refills:  0       CRANBERRY PO        Dose:  1 tablet   Take 1 tablet by mouth every morning   Refills:  0       FISH OIL PO        Dose:  2 capsule   Take 2 capsules by mouth daily   Refills:  0       GABAPENTIN PO        Dose:  300 mg   Take 300 mg by mouth every evening   Refills:  0       GLUCOSAMINE-CHONDROITIN PO        Dose:  1 tablet   Take 1 tablet by mouth 2 times daily   Refills:  0       GTF CHROMIUM PO        Dose:  1000 mcg   Take 1,000 mcg by mouth daily (Takes 5 x 200mcg tablet = 1000mcg dose)   Refills:  0       lisinopril-hydrochlorothiazide 10-12.5 MG per tablet   Commonly known as:  PRINZIDE/ZESTORETIC        Dose:  1 tablet   Take 1 tablet by mouth daily   Refills:  0       MAGNESIUM PO        Dose:  1500 mg   Take 1,500 mg by mouth every morning (Takes 3 x 500mg tablet = 1500mg dose)   Refills:  0       MULTIVITAMIN PO        Dose:  1 tablet   Take 1 tablet by mouth every morning   Refills:  0       * NONFORMULARY        Dose:  300 mg   Take 300 mg by mouth every evening Compounded Progesterone (3 x 100mg tablet = 300mg dose)   Refills:  0       * NONFORMULARY        Dose:  0.125 teaspoonful   Apply 0.125 teaspoonful topically every evening Compounded Estradiol Cream 4mg/GM   Refills:  0       OVER-THE-COUNTER        Dose:  2 drop   Place 2 drops into both eyes every morning (Replens eye drops)   Refills:  0       TURMERIC PO        Dose:  500 mg   Take 500 mg by mouth 2 times daily   Refills:  0       VITAMIN B-12 PO        Dose:  5000 mcg   Take 5,000 mcg by mouth every morning   Refills:  0       VITAMIN C PO        Dose:  1000 mg   Take 1,000 mg by mouth 2 times daily   Refills:  0       * Notice:  This list has 2  medication(s) that are the same as other medications prescribed for you. Read the directions carefully, and ask your doctor or other care provider to review them with you.      STOP taking     IBUPROFEN PO                Where to get your medicines      These medications were sent to [facility] - [address]  [address], [place] [address]     Phone:  [phone]     hydrOXYzine 10 MG tablet         Some of these will need a paper prescription and others can be bought over the counter. Ask your nurse if you have questions.     Bring a paper prescription for each of these medications     HYDROmorphone 2 MG tablet                Protect others around you: Learn how to safely use, store and throw away your medicines at www.disposemymeds.org.        Information about OPIOIDS     PRESCRIPTION OPIOIDS: WHAT YOU NEED TO KNOW    Prescription opioids can be used to help relieve moderate to severe pain and are often prescribed following a surgery or injury, or for certain health conditions. These medications can be an important part of treatment but also come with serious risks. It is important to work with your health care provider to make sure you are getting the safest, most effective care.    WHAT ARE THE RISKS AND SIDE EFFECTS OF OPIOID USE?  Prescription opioids carry serious risks of addiction and overdose, especially with prolonged use. An opioid overdose, often marked by slowed breathing can cause sudden death. The use of prescription opioids can have a number of side effects as well, even when taken as directed:      Tolerance - meaning you might need to take more of a medication for the same pain relief    Physical dependence - meaning you have symptoms of withdrawal when a medication is stopped    Increased sensitivity to pain    Constipation    Nausea, vomiting, and dry mouth    Sleepiness and dizziness    Confusion    Depression    Low levels of testosterone that  can result in lower sex drive, energy, and strength    Itching and sweating    RISKS ARE GREATER WITH:    History of drug misuse, substance use disorder, or overdose    Mental health conditions (such as depression or anxiety)    Sleep apnea    Older age (65 years or older)    Pregnancy    Avoid alcohol while taking prescription opioids.   Also, unless specifically advised by your health care provider, medications to avoid include:    Benzodiazepines (such as Xanax or Valium)    Muscle relaxants (such as Soma or Flexeril)    Hypnotics (such as Ambien or Lunesta)    Other prescription opioids    KNOW YOUR OPTIONS:  Talk to your health care provider about ways to manage your pain that do not involve prescription opioids. Some of these options may actually work better and have fewer risks and side effects:    Pain relievers such as acetaminophen, ibuprofen, and naproxen    Some medications that are also used for depression or seizures    Physical therapy and exercise    Cognitive behavioral therapy, a psychological, goal-directed approach, in which patients learn how to modify physical, behavioral, and emotional triggers of pain and stress    IF YOU ARE PRESCRIBED OPIOIDS FOR PAIN:    Never take opioids in greater amounts or more often than prescribed    Follow up with your primary health care provider and work together to create a plan on how to manage your pain.    Talk about ways to help manage your pain that do not involve prescription opioids    Talk about all concerns and side effects    Help prevent misuse and abuse    Never sell or share prescription opioids    Never use another person's prescription opioids    Store prescription opioids in a secure place and out of reach of others (this may include visitors, children, friends, and family)    Visit www.cdc.gov/drugoverdose to learn about risks of opioid abuse and overdose    If you believe you may be struggling with addiction, tell your health care provider and  ask for guidance or call [facility]'s National Helpline at 1-967-425-HELP    LEARN MORE / www.cdc.gov/drugoverdose/prescribing/guideline.html    Safely dispose of unused prescription opioids: Find your local drug take-back programs and more information about the importance of safe disposal at www.doseofreality.[place].gov             Medication List: This is a list of all your medications and when to take them. Check marks below indicate your daily home schedule. Keep this list as a reference.      Medications           Morning Afternoon Evening Bedtime As Needed    ALIGN PO   Take 1 tablet by mouth every morning   Next Dose Due:  Tomorrow 2/22/2018                                   conjugated estrogens cream   Commonly known as:  PREMARIN   Place vaginally every evening   Next Dose Due:  Tonight                                    CRANBERRY PO   Take 1 tablet by mouth every morning   Next Dose Due:  Tomorrow 2/22/2018                                   FISH OIL PO   Take 2 capsules by mouth daily   Next Dose Due:  Tomorrow 2/22/2018                                   GABAPENTIN PO   Take 300 mg by mouth every evening   Last time this was given:  300 mg on 2/20/2018  7:54 PM   Next Dose Due:  Tonight                                    GLUCOSAMINE-CHONDROITIN PO   Take 1 tablet by mouth 2 times daily   Next Dose Due:  Tonight                                       GTF CHROMIUM PO   Take 1,000 mcg by mouth daily (Takes 5 x 200mcg tablet = 1000mcg dose)   Next Dose Due:  Tomorrow 2/22/2018                                   HYDROmorphone 2 MG tablet   Commonly known as:  DILAUDID   Take 1-2 tablets (2-4 mg) by mouth every 4 hours as needed for moderate to severe pain or severe pain (take one tab for moderate pain, take two tabs for severe pain)   Last time this was given:  4 mg on 2/21/2018 12:03 PM   Next Dose Due:  4 pm                                   hydrOXYzine 10 MG tablet   Commonly known as:  ATARAX   Take 1 tablet (10 mg)  by mouth every 6 hours as needed (muscle spasm)   Last time this was given:  10 mg on 2/21/2018 12:03 PM   Next Dose Due:  6 pm tonight                                    lisinopril-hydrochlorothiazide 10-12.5 MG per tablet   Commonly known as:  PRINZIDE/ZESTORETIC   Take 1 tablet by mouth daily   Next Dose Due:  Tomorrow 2/22/2018                                   MAGNESIUM PO   Take 1,500 mg by mouth every morning (Takes 3 x 500mg tablet = 1500mg dose)   Next Dose Due:  Tomorrow 2/22/2018                                   MULTIVITAMIN PO   Take 1 tablet by mouth every morning   Next Dose Due:  Tomorrow 2/22/2018                                   * NONFORMULARY   Take 300 mg by mouth every evening Compounded Progesterone (3 x 100mg tablet = 300mg dose)   Last time this was given:  300 mg, 0.125 teaspoonful on 2/20/2018  9:05 PM   Next Dose Due:  Tonight                                    * NONFORMULARY   Apply 0.125 teaspoonful topically every evening Compounded Estradiol Cream 4mg/GM   Last time this was given:  300 mg, 0.125 teaspoonful on 2/20/2018  9:05 PM   Next Dose Due:  Tonight                                    OVER-THE-COUNTER   Place 2 drops into both eyes every morning (Replens eye drops)   Next Dose Due:  Tomorrow 2/22/2018                                   TURMERIC PO   Take 500 mg by mouth 2 times daily   Next Dose Due:  Resume anytime.                                      VITAMIN B-12 PO   Take 5,000 mcg by mouth every morning   Next Dose Due:  Tomorrow 2/22/2018                                   VITAMIN C PO   Take 1,000 mg by mouth 2 times daily   Last time this was given:  1,000 mg on 2/21/2018  8:21 AM   Next Dose Due:  Tonight                                       * Notice:  This list has 2 medication(s) that are the same as other medications prescribed for you. Read the directions carefully, and ask your doctor or other care provider to review them with you.

## 2018-02-20 NOTE — ANESTHESIA PROCEDURE NOTES
Peripheral nerve/Neuraxial procedure note : femoral and Adductor canal  Pre-Procedure  Performed by AMENA WONG  Location: pre-op      Pre-Anesthestic Checklist: patient identified, IV checked, site marked, risks and benefits discussed, informed consent, monitors and equipment checked, at physician/surgeon's request and post-op pain management    Timeout  Correct Patient: Yes   Correct Procedure: Yes   Correct Site: Yes   Correct Laterality: Yes   Correct Position: Yes   Site Marked: Yes   .   Procedure Documentation    .    Procedure:  right  Femoral and Adductor canal.  Local skin infiltrated with 5 mL of 1% lidocaine.     Ultrasound used to identify targeted nerve, plexus, or vascular marker and placed a needle adjacent to it., Ultrasound was used to visualize the spread of the anesthetic in close proximity to the above stated nerve. A permanent image is entered into the patient's record.  Patient Prep;mask, sterile gloves, chlorhexidine gluconate and isopropyl alcohol, patient draped.  .  Needle: insulated, short bevel Needle Gauge: 17.    Needle Length (Inches) 2  Insertion Method: Single Shot.   Catheter: 20 G . .  Catheter threaded easily  .  .   .    Assessment/Narrative  Paresthesias: No.  .  The placement was negative for: blood aspirated, painful injection and site bleeding.  Bolus given via needle. No blood aspirated via catheter.   Secured via Tunneling.   Complications: none. Comments:  Continuous Femoral Nerve Block via adductor canal;  25 ml 0.5% Ropivacaine with 1:400,000 Epinephrine

## 2018-02-21 ENCOUNTER — APPOINTMENT (OUTPATIENT)
Dept: PHYSICAL THERAPY | Facility: CLINIC | Age: 68
DRG: 470 | End: 2018-02-21
Attending: ORTHOPAEDIC SURGERY
Payer: MEDICARE

## 2018-02-21 ENCOUNTER — APPOINTMENT (OUTPATIENT)
Dept: OCCUPATIONAL THERAPY | Facility: CLINIC | Age: 68
DRG: 470 | End: 2018-02-21
Attending: ORTHOPAEDIC SURGERY
Payer: MEDICARE

## 2018-02-21 VITALS
WEIGHT: 162 LBS | RESPIRATION RATE: 16 BRPM | DIASTOLIC BLOOD PRESSURE: 55 MMHG | HEART RATE: 31 BPM | TEMPERATURE: 98.7 F | OXYGEN SATURATION: 95 % | HEIGHT: 66 IN | BODY MASS INDEX: 26.03 KG/M2 | SYSTOLIC BLOOD PRESSURE: 121 MMHG

## 2018-02-21 LAB
ANION GAP SERPL CALCULATED.3IONS-SCNC: 7 MMOL/L (ref 3–14)
BUN SERPL-MCNC: 21 MG/DL (ref 7–30)
CALCIUM SERPL-MCNC: 8.3 MG/DL (ref 8.5–10.1)
CHLORIDE SERPL-SCNC: 102 MMOL/L (ref 94–109)
CO2 SERPL-SCNC: 27 MMOL/L (ref 20–32)
CREAT SERPL-MCNC: 1.04 MG/DL (ref 0.52–1.04)
GFR SERPL CREATININE-BSD FRML MDRD: 53 ML/MIN/1.7M2
GLUCOSE BLDC GLUCOMTR-MCNC: 101 MG/DL (ref 70–99)
GLUCOSE SERPL-MCNC: 128 MG/DL (ref 70–99)
GLUCOSE SERPL-MCNC: 99 MG/DL (ref 70–99)
HGB BLD-MCNC: 10 G/DL (ref 11.7–15.7)
POTASSIUM SERPL-SCNC: 4 MMOL/L (ref 3.4–5.3)
SODIUM SERPL-SCNC: 136 MMOL/L (ref 133–144)

## 2018-02-21 PROCEDURE — 97530 THERAPEUTIC ACTIVITIES: CPT | Mod: GP | Performed by: PHYSICAL THERAPIST

## 2018-02-21 PROCEDURE — 97161 PT EVAL LOW COMPLEX 20 MIN: CPT | Mod: GP | Performed by: PHYSICAL THERAPIST

## 2018-02-21 PROCEDURE — 00000146 ZZHCL STATISTIC GLUCOSE BY METER IP

## 2018-02-21 PROCEDURE — 97116 GAIT TRAINING THERAPY: CPT | Mod: GP | Performed by: PHYSICAL THERAPIST

## 2018-02-21 PROCEDURE — 25000132 ZZH RX MED GY IP 250 OP 250 PS 637: Mod: GY | Performed by: ORTHOPAEDIC SURGERY

## 2018-02-21 PROCEDURE — 85018 HEMOGLOBIN: CPT | Performed by: ORTHOPAEDIC SURGERY

## 2018-02-21 PROCEDURE — 80048 BASIC METABOLIC PNL TOTAL CA: CPT | Performed by: PHYSICIAN ASSISTANT

## 2018-02-21 PROCEDURE — 82947 ASSAY GLUCOSE BLOOD QUANT: CPT | Performed by: PHYSICIAN ASSISTANT

## 2018-02-21 PROCEDURE — A9270 NON-COVERED ITEM OR SERVICE: HCPCS | Mod: GY | Performed by: NURSE PRACTITIONER

## 2018-02-21 PROCEDURE — 27210995 ZZH RX 272: Performed by: ORTHOPAEDIC SURGERY

## 2018-02-21 PROCEDURE — 25000132 ZZH RX MED GY IP 250 OP 250 PS 637: Mod: GY | Performed by: NURSE PRACTITIONER

## 2018-02-21 PROCEDURE — 97530 THERAPEUTIC ACTIVITIES: CPT | Mod: GO | Performed by: OCCUPATIONAL THERAPIST

## 2018-02-21 PROCEDURE — 97110 THERAPEUTIC EXERCISES: CPT | Mod: GP | Performed by: PHYSICAL THERAPIST

## 2018-02-21 PROCEDURE — 25000128 H RX IP 250 OP 636: Performed by: ORTHOPAEDIC SURGERY

## 2018-02-21 PROCEDURE — 97535 SELF CARE MNGMENT TRAINING: CPT | Mod: GO | Performed by: OCCUPATIONAL THERAPIST

## 2018-02-21 PROCEDURE — 93010 ELECTROCARDIOGRAM REPORT: CPT | Performed by: INTERNAL MEDICINE

## 2018-02-21 PROCEDURE — 93005 ELECTROCARDIOGRAM TRACING: CPT

## 2018-02-21 PROCEDURE — A9270 NON-COVERED ITEM OR SERVICE: HCPCS | Mod: GY | Performed by: ORTHOPAEDIC SURGERY

## 2018-02-21 PROCEDURE — 40000193 ZZH STATISTIC PT WARD VISIT: Performed by: PHYSICAL THERAPIST

## 2018-02-21 PROCEDURE — 25000128 H RX IP 250 OP 636: Performed by: PHYSICIAN ASSISTANT

## 2018-02-21 PROCEDURE — 40000133 ZZH STATISTIC OT WARD VISIT: Performed by: OCCUPATIONAL THERAPIST

## 2018-02-21 PROCEDURE — 36415 COLL VENOUS BLD VENIPUNCTURE: CPT | Performed by: PHYSICIAN ASSISTANT

## 2018-02-21 PROCEDURE — 97165 OT EVAL LOW COMPLEX 30 MIN: CPT | Mod: GO | Performed by: OCCUPATIONAL THERAPIST

## 2018-02-21 PROCEDURE — 99232 SBSQ HOSP IP/OBS MODERATE 35: CPT | Performed by: PHYSICIAN ASSISTANT

## 2018-02-21 RX ORDER — HYDROXYZINE HYDROCHLORIDE 10 MG/1
10 TABLET, FILM COATED ORAL EVERY 6 HOURS PRN
Qty: 30 TABLET | Refills: 0 | Status: SHIPPED | OUTPATIENT
Start: 2018-02-21 | End: 2023-02-10

## 2018-02-21 RX ORDER — HYDROMORPHONE HYDROCHLORIDE 2 MG/1
2-4 TABLET ORAL EVERY 4 HOURS PRN
Qty: 60 TABLET | Refills: 0 | Status: SHIPPED | OUTPATIENT
Start: 2018-02-21 | End: 2023-02-10

## 2018-02-21 RX ORDER — HYDROMORPHONE HYDROCHLORIDE 2 MG/1
2-4 TABLET ORAL EVERY 4 HOURS PRN
Qty: 50 TABLET | Refills: 0 | Status: SHIPPED | OUTPATIENT
Start: 2018-02-21 | End: 2018-02-21

## 2018-02-21 RX ADMIN — ACETAMINOPHEN 975 MG: 325 TABLET, FILM COATED ORAL at 08:21

## 2018-02-21 RX ADMIN — HYDROMORPHONE HYDROCHLORIDE 4 MG: 2 TABLET ORAL at 04:08

## 2018-02-21 RX ADMIN — ENOXAPARIN SODIUM 40 MG: 40 INJECTION SUBCUTANEOUS at 12:04

## 2018-02-21 RX ADMIN — SODIUM CHLORIDE 500 ML: 9 INJECTION, SOLUTION INTRAVENOUS at 09:00

## 2018-02-21 RX ADMIN — Medication 400 MG: at 08:21

## 2018-02-21 RX ADMIN — HYDROXYZINE HYDROCHLORIDE 10 MG: 10 TABLET ORAL at 03:23

## 2018-02-21 RX ADMIN — HYDROMORPHONE HYDROCHLORIDE 4 MG: 2 TABLET ORAL at 12:03

## 2018-02-21 RX ADMIN — ACETAMINOPHEN 975 MG: 325 TABLET, FILM COATED ORAL at 00:37

## 2018-02-21 RX ADMIN — HYDROMORPHONE HYDROCHLORIDE 0.5 MG: 1 INJECTION, SOLUTION INTRAMUSCULAR; INTRAVENOUS; SUBCUTANEOUS at 05:53

## 2018-02-21 RX ADMIN — HYDROMORPHONE HYDROCHLORIDE 4 MG: 2 TABLET ORAL at 00:37

## 2018-02-21 RX ADMIN — KETOROLAC TROMETHAMINE 15 MG: 15 INJECTION, SOLUTION INTRAMUSCULAR; INTRAVENOUS at 03:23

## 2018-02-21 RX ADMIN — HYDROXYZINE HYDROCHLORIDE 10 MG: 10 TABLET ORAL at 12:03

## 2018-02-21 RX ADMIN — OXYCODONE HYDROCHLORIDE AND ACETAMINOPHEN 1000 MG: 500 TABLET ORAL at 08:21

## 2018-02-21 RX ADMIN — KETOROLAC TROMETHAMINE 15 MG: 15 INJECTION, SOLUTION INTRAMUSCULAR; INTRAVENOUS at 08:22

## 2018-02-21 RX ADMIN — CEFAZOLIN SODIUM 1 G: 10 INJECTION, POWDER, FOR SOLUTION INTRAVENOUS at 05:13

## 2018-02-21 ASSESSMENT — ACTIVITIES OF DAILY LIVING (ADL): PREVIOUS_RESPONSIBILITIES: MEAL PREP;HOUSEKEEPING;LAUNDRY;SHOPPING;MEDICATION MANAGEMENT;FINANCES;DRIVING

## 2018-02-21 NOTE — PROGRESS NOTES
Nursing note  Pt will be discharging to home at 1530, all the necessary information was given to pt including her prescriptions(dilaudid/atarax), 4x4 and medi pore tape.. Pt verbalized understanding and signed. dressing was changed, incision dry and clean.

## 2018-02-21 NOTE — PLAN OF CARE
Problem: Patient Care Overview  Goal: Plan of Care/Patient Progress Review  Discharge Planner PT   Patient plan for discharge: home with OP PT  Current status: PT eval completed and treatment initiated, pt s/p TKA POD #1, soft BP's dizziness when up but yuniel fair, SBA for bed mob, SBA for transfers with FWW, CGA for amb with FWW, slow lynda, cont dizziness but able to amb 260' slowly with FWW.  AAROM 10-80 degrees, I with SLR.  Barriers to return to prior living situation: lives alone, current soft bp's with dizziness fall risk  Recommendations for discharge: home with OP PT when goals are met  Rationale for recommendations: cont therapies to progress to mod I with all mob to allow safe dc to home, cont with OP to maximize surgical outcome       Entered by: TIARA MONTOYA 02/21/2018 9:57 AM

## 2018-02-21 NOTE — PLAN OF CARE
Problem: Patient Care Overview  Goal: Plan of Care/Patient Progress Review  Outcome: Improving  Pt is AAOx4, pain is relieved with pharm interventions. CMS intact, dressing CDI, VSS. Contreras dc, adequate output, due to void. Will continue to monitor.

## 2018-02-21 NOTE — PROGRESS NOTES
"Orthopedic Surgery  2/21/2018  POD #1    S: Patient complains of some itching.  Complained of dizziness this AM but feeling much better now.    O: Blood pressure 101/54, pulse (!) 31, temperature 98.7  F (37.1  C), temperature source Oral, resp. rate 16, height 1.676 m (5' 6\"), weight 73.5 kg (162 lb), SpO2 95 %.  Lab Results   Component Value Date    HGB 10.0 02/21/2018     Neurovascularly intact.  Calves are negative bilaterally, both soft and nontender.  The dressing is C/D/I.    A: Ms. Myles is doing well status post right total knee arthroplasty.    P:   1. Dressing change today, use ABD and tape dressing.  No aquacel or island dressing.  2. Mobilize and continue physical therapy.   3. Discharge to home today.    Padmini Harper PA-C  [phone]  "

## 2018-02-21 NOTE — PLAN OF CARE
Problem: Knee Arthroplasty (Total, Partial) (Adult)  Goal: Signs and Symptoms of Listed Potential Problems Will be Absent, Minimized or Managed (Knee Arthroplasty)  Signs and symptoms of listed potential problems will be absent, minimized or managed by discharge/transition of care (reference Knee Arthroplasty (Total, Partial) (Adult) CPG).   Outcome: Improving    Discharge to home. Will follow up with orthopedic as instructed. Discharge education/medications complete. Transport arranged with daughter.

## 2018-02-21 NOTE — PROGRESS NOTES
MD Notification    Notified Person:  MD    Notified Persons Name: Po Mayberry IRA    Notification Date/Time: 2/20/18 1935     Notification Interaction:  Paged Physician    Purpose of Notification:  Re order PTA  Progesterone, and estrogen cream. Pt has own home meds here     Orders Received: Pending. provider Placed orders.     Comments:

## 2018-02-21 NOTE — PLAN OF CARE
Problem: Knee Arthroplasty (Total, Partial) (Adult)  Goal: Signs and Symptoms of Listed Potential Problems Will be Absent, Minimized or Managed (Knee Arthroplasty)  Signs and symptoms of listed potential problems will be absent, minimized or managed by discharge/transition of care (reference Knee Arthroplasty (Total, Partial) (Adult) CPG).   A/o x4.  VSS. RA. CPAP on overnight. Capno was WDL. Regular diet, tolerating well. No nausea. Conrteras patent. Dressing CDI, CMS intact. WBAT. Sat up on side of bed, tolerated well.  Pain managed with IV dilaudid, switched to PO overnight, encourage PO meds. Plan to d/c home tomorrow.

## 2018-02-21 NOTE — PROGRESS NOTES
[facility]    Hospitalist Progress Note    Assessment & Plan   Shahrzad Myles is a 67 year old female with a past medical history of hypertension who was admitted on 2/20/2018 for a right total knee arthroplasty performed by Dr. Mcfarland under spinal and peripheral nerve block with an EBL of 100mL. The hospitalist service was asked to see the patient for medical comanagement.     S/p right knee arthroplasty  - Continued postoperative management per primary service  - PT/OT    Presyncope  Paged from nursing staff of patient feeling nauseated, lightheaded and dizzy at 0830 this morning with sensation of passing out. Patient did not experience an LOC. Bedside RN noted blood pressure to be low to value 83/46 and heart rate via pulse oximeter monitor was reported to be in 30s. RN placed HOB flat with improvement in BP to 97/52, notified primary service who ordered IVF bolus. Orthostatic vitals performed and positive prior to fluid bolus. Suspected etiology dehydration vs adverse effect of narcotic pain medications. EKG performed & indicating NSR with HR 63, slight T-wave flattening in lateral leads; pt asymptomatic. BMP indicating slight rise in Cr to 1.04 (prev 0.9), further confirming dehydration. Pt markedly improved following IVF bolus, no longer symptomatic and has been OOB and sitting up in chair without issue.  - Monitor on telemetry remainder of day, has been NSR since  - Encourage PO intake, fluids  - Limit use of narcotic pain medications as able    ABL anemia  Hgb 12.2--10. No evidence of active/ongoing bleeding. Pt presyncopal this AM presumably 2/2 volume depletion, resolved presently. Hemodynamics acceptable.     HTN   Patient is well managed on lisinopril-hctz  - Holding PTA HCTZ, lisinopril given soft BPs, may resume at discharge     Post menopausal  - Continue PTA estradiol/progesterone, patient supplied medication     Constipation   Well controlled on magnesium and vitamin C  - Continue  PTA mag and vit C    # Pain Assessment:   Current Pain Score 2/21/2018 2/21/2018 2/21/2018   Patient currently in pain? - - -   Pain score (0-10) 3 3 3   Pain location - - -   Pain descriptors - - -   - Shahrzad is experiencing pain due to postoperative pain. Pain management was discussed and the plan was created in a collaborative fashion.  Shahrzad's response to the current recommendations: engaged  compliant  - Please see the plan for pain management as documented above    DVT Prophylaxis: Defer to primary service  Code Status: Prior    Disposition: Expected discharge today per primary service.    Eddi Wallace PA-C    Interval History   Pt seen & evaluated. States feels markedly improved in comparison to this morning, denies further dizziness or lightheadedness. Has been up in chair and worked with PT without any symptoms. Pain is controlled on present regimen. Planning to discharge home this afternoon.    -Data reviewed today: I reviewed all new labs and imaging results over the last 24 hours. I personally reviewed the EKG tracing showing sinus rhythm.    Physical Exam   Temp: 98.7  F (37.1  C) Temp src: Oral BP: 121/55 Pulse: (!) 31 Heart Rate: 62 Resp: 16 SpO2: 95 % O2 Device: None (Room air) Oxygen Delivery: 2 LPM  Vitals:    02/20/18 1029   Weight: 73.5 kg (162 lb)     Vital Signs with Ranges  Temp:  [97.3  F (36.3  C)-99.3  F (37.4  C)] 98.7  F (37.1  C)  Pulse:  [31-87] 31  Heart Rate:  [40-76] 62  Resp:  [10-19] 16  BP: ()/(46-74) 121/55  SpO2:  [92 %-100 %] 95 %  I/O last 3 completed shifts:  In: 3530 [P.O.:1030; I.V.:2500]  Out: 800 [Urine:700; Blood:100]    GEN: well-developed, well-nourished, appears comfortable  PULM: lungs CTA bilaterally, no increased work of breathing, no wheeze, crackles, rhonchi  CV: RRR, S1 & S2, no murmur  GI: soft, nontender, nondistended, +BS in all 4 quadrants  MSK: moving BUE spontaneously & symmetrically, RLE with ACE wrap in place, LLE with JEANE hose, no edema; performs  ROM at level of ankles bilaterally  SKIN: warm & dry without rash or wound    Medications       sodium chloride (PF)  3 mL Intracatheter Q8H     enoxaparin  40 mg Subcutaneous Q24H     acetaminophen  975 mg Oral Q8H     gabapentin (NEURONTIN) capsule 300 mg  300 mg Oral QPM     lisinopril  10 mg Oral Daily     magnesium oxide  400 mg Oral BID     ascorbic acid  1,000 mg Oral BID     Compounded Progesterone 100 mg tabs  300 mg Oral QPM     Compounded Estradiol cream 4mg/GM  0.125 teaspoonful Topical QPM       Data     Recent Labs  Lab 02/21/18  1138 02/21/18  0631 02/20/18  1029   HGB  --  10.0* 12.2   PLT  --   --  195     --   --    POTASSIUM 4.0  --  3.6   CHLORIDE 102  --   --    CO2 27  --   --    BUN 21  --   --    CR 1.04  --  0.90   ANIONGAP 7  --   --    GABY 8.3*  --   --    * 99  --        Recent Results (from the past 24 hour(s))   XR Knee Port Right 1/2 Views    Narrative    RIGHT KNEE TWO VIEWS  2/20/2018 2:39 PM     HISTORY:  Status post total knee arthroplasty.     COMPARISON: None.      Impression    IMPRESSION: Right total knee replacement. Air remains in the joint  space. Alignment appears anatomic. No evidence of fracture.    XIANG BRANHAM MD

## 2018-02-21 NOTE — DISCHARGE SUMMARY
"Discharge Summary    Shahrzad Myles MRN# [id number]   YOB: 1950 Age: 67 year old     Date of Admission: 2/20/2018    Date of Discharge: 2/21/2018    Reason for Admission: Shahrzad Myles is an 67 year old female who was admitted to the hospital following surgery.    Preoperative Diagnosis: RIGHT KNEE DJD     Postoperative Diagnosis: RIGHT KNEE DJD     Procedure Completed:  right total knee arthroplasty    Hospital Course:  Ms. Myles was admitted and underwent the above procedure. The patient tolerated the procedure well. There were no complications. Following surgery she was admitted to the floor.  During her stay she did not require any blood transfusions. Her pain was controlled with oral pain medication.  During her stay she progressed well in physical therapy and all the therapy goals were met.     Discharge Physical Exam:  /55 (BP Location: Left arm)  Pulse (!) 31  Temp 98.7  F (37.1  C) (Oral)  Resp 16  Ht 1.676 m (5' 6\")  Wt 73.5 kg (162 lb)  SpO2 95%  BMI 26.15 kg/m2  Neurovascularly intact, distal pulses present bilaterally.  Calves are negative bilaterally, both soft and nontender.    Assessment: Ms. Myles is stable and doing well status post right total knee arthroplasty.    Plan: We will discharge her home on analgesics and deep venous thrombosis prophylaxis.  She will follow-up with me approximately 2 weeks from surgery.  She may call [phone] to schedule an appointment.    Meds:   Shahrzad Myles   Home Medication Instructions NAVEEN:[id number]    Printed on:02/21/18 2287   Medication Information                      Ascorbic Acid (VITAMIN C PO)  Take 1,000 mg by mouth 2 times daily             conjugated estrogens (PREMARIN) cream  Place vaginally every evening             CRANBERRY PO  Take 1 tablet by mouth every morning             Cyanocobalamin (VITAMIN B-12 PO)  Take 5,000 mcg by mouth every morning             GABAPENTIN PO  Take 300 mg by mouth " every evening             GLUCOSAMINE-CHONDROITIN PO  Take 1 tablet by mouth 2 times daily             GTF CHROMIUM PO  Take 1,000 mcg by mouth daily (Takes 5 x 200mcg tablet = 1000mcg dose)             HYDROmorphone (DILAUDID) 2 MG tablet  Take 1-2 tablets (2-4 mg) by mouth every 4 hours as needed for moderate to severe pain or severe pain (take one tab for moderate pain, take two tabs for severe pain)             hydrOXYzine (ATARAX) 10 MG tablet  Take 1 tablet (10 mg) by mouth every 6 hours as needed (muscle spasm)             lisinopril-hydrochlorothiazide (PRINZIDE/ZESTORETIC) 10-12.5 MG per tablet  Take 1 tablet by mouth daily             MAGNESIUM PO  Take 1,500 mg by mouth every morning (Takes 3 x 500mg tablet = 1500mg dose)             Multiple Vitamins-Minerals (MULTIVITAMIN PO)  Take 1 tablet by mouth every morning             NONFORMULARY  Take 300 mg by mouth every evening Compounded Progesterone  (3 x 100mg tablet = 300mg dose)             NONFORMULARY  Apply 0.125 teaspoonful topically every evening Compounded Estradiol Cream 4mg/GM             Omega-3 Fatty Acids (FISH OIL PO)  Take 2 capsules by mouth daily             order for DME  Equipment being ordered: Walker Wheels () and Walker ()  Treatment Diagnosis: Impaired gait.             OVER-THE-COUNTER  Place 2 drops into both eyes every morning (Replens eye drops)             Probiotic Product (ALIGN PO)  Take 1 tablet by mouth every morning             TURMERIC PO  Take 500 mg by mouth 2 times daily

## 2018-02-21 NOTE — PLAN OF CARE
Problem: Patient Care Overview  Goal: Plan of Care/Patient Progress Review  OT order received.  Evaluation completed, treatment initiated.  Patient is a 67 year old female who is s/p R TKA.  She lives alone in a condo where all needs are met on main level.  Daughters live close by and can assist as needed.    Discharge Planner OT   Patient plan for discharge: pt hopes to DC to home today  Current status: Mi toilet transfer.  Mi LE dressing.  Abel tub/shower transfer, recommend daughter be present for same.  Ambulated 300' with 2WW to tub room, CGA.  BP at start of session 119/62, following ambulation 121/55.  Barriers to return to prior living situation: none  Recommendations for discharge: home with daughters to assist with home tasks and shower prn  Rationale for recommendations: patient doing well with ADLs, needing only minimal assist for shower.       Entered by: NEAL RIVAS 02/21/2018 11:22 AM     Occupational Therapy Discharge Summary    Reason for therapy discharge:    All goals and outcomes met, no further needs identified.    Progress towards therapy goal(s). See goals on Care Plan in [facility] electronic health record for goal details.  Goals met    Therapy recommendation(s):    No further therapy is recommended.

## 2018-02-21 NOTE — PROGRESS NOTES
02/21/18 1000   Quick Adds   Type of Visit Initial Occupational Therapy Evaluation   Living Environment   Lives With alone   Living Arrangements condominium   Home Accessibility no concerns   Transportation Available car;family or friend will provide   Living Environment Comment Pt lives alone, but daughters live nearby and can assist   Self-Care   Usual Activity Tolerance good   Current Activity Tolerance moderate   Regular Exercise yes   Activity/Exercise Type walking;other (see comments)  (tennis)   Exercise Amount/Frequency 3-5 times/wk   Equipment Currently Used at Home none   Activity/Exercise/Self-Care Comment Pt could walk 3 miles prior to surgery; played tennis a few weeks ago   Functional Level Prior   Ambulation 0-->independent   Transferring 0-->independent   Toileting 0-->independent   Bathing 0-->independent   Dressing 0-->independent   Eating 0-->independent   Communication 0-->understands/communicates without difficulty   Swallowing 0-->swallows foods/liquids without difficulty   Cognition 0 - no cognition issues reported   Fall history within last six months no   Which of the above functional risks had a recent onset or change? none   Prior Functional Level Comment Pt reports indep with all ADLs prior to surgery   General Information   Onset of Illness/Injury or Date of Surgery - Date 02/20/18   Referring Physician Jatinder Mercer   Patient/Family Goals Statement Pt hopes to DC to home today   Additional Occupational Profile Info/Pertinent History of Current Problem Pt is s/p R TKA   Precautions/Limitations fall precautions   Weight-Bearing Status - LLE full weight-bearing   Weight-Bearing Status - RLE weight-bearing as tolerated   General Observations Pt seated in chair upon arrival; pt agreable to OT session   General Info Comments Actiity order: ambulate with assist 3x daily   Cognitive Status Examination   Cognitive Comment No cognitive concerns   Visual Perception   Visual Perception Wears  glasses   Sensory Examination   Sensory Comments Denies N/T   Pain Assessment   Patient Currently in Pain Yes, see Vital Sign flowsheet   Posture   Posture protracted shoulders   Range of Motion (ROM)   ROM Comment Pt demonstrated functional BUE AROM during dressing task, walker use   Strength   Strength Comments Pt demonstrated functional BUE strength during dressing task, walker use   Transfer Skill: Sit to Stand   Level of [place]: Sit/Stand contact guard   Physical Assist/Nonphysical Assist: Sit/Stand supervision;set-up required   Transfer Skill: Sit to Stand weight-bearing as tolerated   Assistive Device for Transfer: Sit/Stand rolling walker   Transfer Skill: Toilet Transfer   Level of [place]: Toilet contact guard   Physical Assist/Nonphysical Assist: Toilet supervision;verbal cues   Weight-Bearing Restrictions: Toilet weight-bearing as tolerated   Assistive Device rolling walker;grab bars   Toilet Transfer Skill Comments Pt has vanity on side of toilet at home   Tub/Shower Transfer   Tub/Shower Transfer Comments Pt has tub/shower combo at home   Balance   Balance Comments Good seated; good ambulating in baires with 2WW   Lower Body Dressing   Level of [place]: Dress Lower Body contact guard   Physical Assist/Nonphysical Assist: Dress Lower Body set-up required;verbal cues   Toileting   Level of [place]: Toilet independent   Eating/Self Feeding   Level of [place]: Eating independent   Instrumental Activities of Daily Living (IADL)   Previous Responsibilities meal prep;housekeeping;laundry;shopping;medication management;finances;driving   IADL Comments Daughters can assist prn   Activities of Daily Living Analysis   Impairments Contributing to Impaired Activities of Daily Living post surgical precautions;strength decreased;pain   General Therapy Interventions   Planned Therapy Interventions ADL retraining;transfer training   Clinical Impression   Criteria for Skilled Therapeutic  "Interventions Met yes, treatment indicated   OT Diagnosis impaired ADLs   Influenced by the following impairments post-op pain, weakness, dizziness   Assessment of Occupational Performance 3-5 Performance Deficits   Identified Performance Deficits LE dressing, toilet transfer, tub transfer   Clinical Decision Making (Complexity) Low complexity   Therapy Frequency daily   Predicted Duration of Therapy Intervention (days/wks) 1 day   Anticipated Discharge Disposition Home;Home with Assist   Risks and Benefits of Treatment have been explained. Yes   Patient, Family & other staff in agreement with plan of care Yes   [facility] AM-PAC  \"6 Clicks\" Daily Activity Inpatient Short Form   1. Putting on and taking off regular lower body clothing? 3 - A Little   2. Bathing (including washing, rinsing, drying)? 3 - A Little   3. Toileting, which includes using toilet, bedpan or urinal? 3 - A Little   4. Putting on and taking off regular upper body clothing? 4 - None   5. Taking care of personal grooming such as brushing teeth? 4 - None   6. Eating meals? 4 - None   Daily Activity Raw Score (Score out of 24.Lower scores equate to lower levels of function) 21   Total Evaluation Time   Total Evaluation Time (Minutes) 8     "

## 2018-02-21 NOTE — PROGRESS NOTES
Nursing note  Pt is POD # 1 of RTKA. Pt a/o x 4, up with assist of 1/walker/gb ambulated on the hallway w/PT. Pain under control with Dilaudid/toradol. At 08:30 this morning, pt started c/o nausea,dizziness/lightheadedness. Pt reported feeling like she is going to pass out. Vital signs was taken BP was 100/47, HR 40,scheduled lisinopril was held. The writer continue to watch pt closely. Pt reported feeling the same. PAFlorentin Garcia) was notified. Bolus 500 cc started, placed pt on cardiac monitoring when pt HR dropped down to 34 and BP to 83/46, SpO2 95% on RA and tele is NSR. With in 10 mins, pt reported feeling better. Series of vitals was taken SEE DOC FLOW SHEET. PT came later and walked with pt. Will continue to monitor.

## 2018-02-22 LAB — INTERPRETATION ECG - MUSE: NORMAL

## 2018-02-22 NOTE — PLAN OF CARE
Problem: Patient Care Overview  Goal: Plan of Care/Patient Progress Review  Discharge Planner PT   Patient plan for discharge: Disch to home with help of her daughter and OPPT.  Current status: PT: Patient sufficiently independent in exercise, bed mobility, transfers, and gait with FWW, WBAT R, 150'.  Declines stair negotiation as she has none at her home.  R Knee ROM:  10-80 degrees.    Barriers to return to prior living situation: None.  Recommendations for discharge: Disch to home with help of her daughter and OPPT.  Rationale for recommendations: Will continue to need skilled PT for recovery of greater functional independence, mobility, and safety for negotiation of her chosen residence.    Physical Therapy Discharge Summary    Reason for therapy discharge:    Discharged to home with outpatient therapy.    Progress towards therapy goal(s). See goals on Care Plan in [facility] electronic health record for goal details.  Goals partially met.  Barriers to achieving goals:   discharge from facility.    Therapy recommendation(s):    Continued therapy is recommended.  Rationale/Recommendations:  Will continue to need skilled PT for recovery of greater functional independence, mobility, and safety for negotiation of chosen residence..  Continue home exercise program.         Entered by: Bo Tadeo 02/21/2018 10:14 PM

## 2018-02-23 NOTE — OP NOTE
Operative Note    Shahrzad Myles MRN# [id number]   YOB: 1950 Age: 67 year old   Date of Procedure: 2/20/2018    1st Assistant: Padmini Harper PA-C    PREOPERATIVE DIAGNOSIS: Right knee osteoarthritis, failure to respond to conservative management.     POSTOPERATIVE DIAGNOSIS: Right knee osteoarthritis, failure to respond to conservative management.     PROCEDURE: Right total knee arthroplasty, minimally invasive mid vastus technique, Depuy Attune components, posterior stabilized, fixed bearing.  Tourniquet-less technique.     DESCRIPTION OF PROCEDURE: Shahrzad Myles was brought to the operating room. After satisfactory anesthesia, the right lower extremity was prepped and draped in the usual sterile fashion. The patient received 1 gram of Ancef preoperatively.     Straight anterior incision was made. Dissection was carried down to the extensor mechanism. Medial arthrotomy was made. Mid vastus exposure was developed.  Advanced OA noted.   Patella was exposed, measured and resected to accept a size 38mm, asymmetric patella. The knee was then flexed up. Intramedullary guide was placed at 5 degrees as per the preoperative plan. The distal femoral cut was made followed by anteroposterior cuts and chamfer cuts. Box cut was made for the femoral component as well. Femur sized to be a size 5 Narrow.   Attention was then directed to the tibia. Tibial cut was made perpendicular to the long axis of the tibia in both AP and lateral planes, 3 degree posterior slope. The tibia sized to be a size 3. Soft tissue balancing was performed. Trial reduction was performed and with a 7-mm PS insert, there was excellent soft tissue balancing, patellar tracking, alignment as well as motion. Trial components were removed. Tibial baseplate was prepared for size 3 baseplate. All 3 components were cemented in place without difficulty. Excess cement was removed. The real 7-mm PS insert was impacted in place and then the wound  was closed in sequential layers including interrupted 0 Vicryl as well as a running 0 Stratafix suture in the extensor mechanism, interrupted 2-0 Vicryl, running 2-0 V-lock, and 3-0 subcuticular monocryl. Dermabond Prino dressing was applied. Sterile dressing was applied. The patient left the operating room in satisfactory condition. Patient received 1 gm of tranexamic acid pre-op and at closure, and a 3 minute dilute betadine soak was performed prior to closure.  Periarticular injection was performed.      PARISH KILGORE MD

## 2020-08-04 ENCOUNTER — OFFICE VISIT (OUTPATIENT)
Dept: URBAN - METROPOLITAN AREA CLINIC 7 | Facility: CLINIC | Age: 70
End: 2020-08-04

## 2020-08-06 ENCOUNTER — OFFICE VISIT (OUTPATIENT)
Dept: URBAN - METROPOLITAN AREA CLINIC 7 | Facility: CLINIC | Age: 70
End: 2020-08-06

## 2020-08-26 ENCOUNTER — OFFICE VISIT (OUTPATIENT)
Dept: URBAN - METROPOLITAN AREA CLINIC 7 | Facility: CLINIC | Age: 70
End: 2020-08-26

## 2020-09-02 NOTE — PROGRESS NOTES
02/21/18 0900   Quick Adds   Type of Visit Initial PT Evaluation   Living Environment   Lives With alone   Living Arrangements condominium   Home Accessibility no concerns   Self-Care   Regular Exercise yes   Activity/Exercise Type other (see comments);walking  (yrnnis)   Exercise Amount/Frequency 3-5 times/wk   Equipment Currently Used at Home none   Functional Level Prior   Ambulation 0-->independent   Transferring 0-->independent   Toileting 0-->independent   Bathing 0-->independent   Dressing 0-->independent   Eating 0-->independent   Communication 0-->understands/communicates without difficulty   Swallowing 0-->swallows foods/liquids without difficulty   Cognition 0 - no cognition issues reported   Fall history within last six months no   Which of the above functional risks had a recent onset or change? none   Prior Functional Level Comment Pt reports she went home with [facility] after last TKA 2.5 years ago   General Information   Onset of Illness/Injury or Date of Surgery - Date 02/20/18   Referring Physician Jatinder Hinds   Patient/Family Goals Statement pt states she will discharge to home, dtr's are near by to A   Pertinent History of Current Problem (include personal factors and/or comorbidities that impact the POC) s/p TKA   Precautions/Limitations fall precautions   Weight-Bearing Status - LUE full weight-bearing   Weight-Bearing Status - RUE full weight-bearing   Weight-Bearing Status - LLE full weight-bearing   Weight-Bearing Status - RLE weight-bearing as tolerated   Cognitive Status Examination   Orientation orientation to person, place and time   Level of Consciousness alert   Follows Commands and Answers Questions 100% of the time   Personal Safety and Judgment intact   Memory intact   Pain Assessment   Patient Currently in Pain Yes, see Vital Sign flowsheet  (3/10 sduring ex)   Integumentary/Edema   Integumentary/Edema Comments s/p TKA per surgery   Range of Motion (ROM)   ROM Comment R LE limited s/p  · Will check Accucheck QAC and HS with SSI "TKA   Strength   Strength Comments R LE limited s/p TKA but able to complete SLR'd an SAQ's   Bed Mobility   Bed Mobility Comments bed mob supine to sit with SBA, A for set up   Transfer Skills   Transfer Comments sit to stand with SBA with FWW and cues   Gait   Gait Comments amb with FWW with SBA and cues 30'   Balance   Balance Comments impaired standing dynamic balance requires B UE support and SBA   Coordination   Coordination no deficits were identified   Muscle Tone   Muscle Tone no deficits were identified   General Therapy Interventions   Planned Therapy Interventions gait training;ROM;strengthening;stretching;transfer training;bed mobility training   Clinical Impression   Criteria for Skilled Therapeutic Intervention yes, treatment indicated   PT Diagnosis difficulty walking   Influenced by the following impairments post op pain, dizziness, decreased ROM, strength, balance and act yuniel   Functional limitations due to impairments impaired functional mob I and safety   Clinical Presentation Stable/Uncomplicated   Clinical Presentation Rationale clinical judgement   Clinical Decision Making (Complexity) Low complexity   Therapy Frequency` 2 times/day   Predicted Duration of Therapy Intervention (days/wks) 2 days   Anticipated Equipment Needs at Discharge front wheeled walker   Anticipated Discharge Disposition Home with Assist;Home with Outpatient Therapy   Risk & Benefits of therapy have been explained Yes   Patient, Family & other staff in agreement with plan of care Yes   Clinical Impression Comments y   [facility] [employer] \"6 Clicks\"   2016, Trustees of [facility], under license to [employer].  All rights reserved.   6 Clicks Short Forms Basic Mobility Inpatient Short Form   [facility] [employer]  \"6 Clicks\" V.2 Basic Mobility Inpatient Short Form   1. Turning from your back to your side while in a flat bed without using bedrails? 4 - None   2. Moving from lying on your back to sitting " on the side of a flat bed without using bedrails? 4 - None   3. Moving to and from a bed to a chair (including a wheelchair)? 4 - None   4. Standing up from a chair using your arms (e.g., wheelchair, or bedside chair)? 4 - None   5. To walk in hospital room? 4 - None   6. Climbing 3-5 steps with a railing? 3 - A Little   Basic Mobility Raw Score (Score out of 24.Lower scores equate to lower levels of function) 23   Total Evaluation Time   Total Evaluation Time (Minutes) 10

## 2020-10-07 ENCOUNTER — TELEPHONE ENCOUNTER (OUTPATIENT)
Dept: URBAN - METROPOLITAN AREA CLINIC 9 | Facility: CLINIC | Age: 70
End: 2020-10-07

## 2020-10-14 ENCOUNTER — TELEPHONE ENCOUNTER (OUTPATIENT)
Dept: URBAN - METROPOLITAN AREA CLINIC 9 | Facility: CLINIC | Age: 70
End: 2020-10-14

## 2022-02-23 ENCOUNTER — APPOINTMENT (RX ONLY)
Dept: URBAN - METROPOLITAN AREA CLINIC 114 | Facility: CLINIC | Age: 72
Setting detail: DERMATOLOGY
End: 2022-02-23

## 2022-02-23 DIAGNOSIS — D49.2 NEOPLASM OF UNSPECIFIED BEHAVIOR OF BONE, SOFT TISSUE, AND SKIN: ICD-10-CM

## 2022-02-23 DIAGNOSIS — L82.1 OTHER SEBORRHEIC KERATOSIS: ICD-10-CM

## 2022-02-23 DIAGNOSIS — D22 MELANOCYTIC NEVI: ICD-10-CM

## 2022-02-23 DIAGNOSIS — L85.3 XEROSIS CUTIS: ICD-10-CM

## 2022-02-23 DIAGNOSIS — L81.4 OTHER MELANIN HYPERPIGMENTATION: ICD-10-CM

## 2022-02-23 DIAGNOSIS — L90.5 SCAR CONDITIONS AND FIBROSIS OF SKIN: ICD-10-CM

## 2022-02-23 DIAGNOSIS — L57.8 OTHER SKIN CHANGES DUE TO CHRONIC EXPOSURE TO NONIONIZING RADIATION: ICD-10-CM | Status: INADEQUATELY CONTROLLED

## 2022-02-23 DIAGNOSIS — D18.0 HEMANGIOMA: ICD-10-CM

## 2022-02-23 PROBLEM — D22.5 MELANOCYTIC NEVI OF TRUNK: Status: ACTIVE | Noted: 2022-02-23

## 2022-02-23 PROBLEM — D18.01 HEMANGIOMA OF SKIN AND SUBCUTANEOUS TISSUE: Status: ACTIVE | Noted: 2022-02-23

## 2022-02-23 PROCEDURE — ? COUNSELING

## 2022-02-23 PROCEDURE — 11102 TANGNTL BX SKIN SINGLE LES: CPT | Mod: 59

## 2022-02-23 PROCEDURE — ? TREATMENT REGIMEN

## 2022-02-23 PROCEDURE — 40490 BIOPSY OF LIP: CPT

## 2022-02-23 PROCEDURE — ? BIOPSY BY SHAVE METHOD

## 2022-02-23 PROCEDURE — 11103 TANGNTL BX SKIN EA SEP/ADDL: CPT

## 2022-02-23 PROCEDURE — 99203 OFFICE O/P NEW LOW 30 MIN: CPT | Mod: 25

## 2022-02-23 ASSESSMENT — LOCATION ZONE DERM
LOCATION ZONE: ARM
LOCATION ZONE: LEG
LOCATION ZONE: NECK
LOCATION ZONE: TRUNK
LOCATION ZONE: FEET
LOCATION ZONE: LIP

## 2022-02-23 ASSESSMENT — LOCATION SIMPLE DESCRIPTION DERM
LOCATION SIMPLE: LEFT LIP
LOCATION SIMPLE: RIGHT KNEE
LOCATION SIMPLE: UPPER BACK
LOCATION SIMPLE: RIGHT ANTERIOR NECK
LOCATION SIMPLE: LEFT KNEE
LOCATION SIMPLE: ABDOMEN
LOCATION SIMPLE: LEFT FOOT
LOCATION SIMPLE: RIGHT SHOULDER
LOCATION SIMPLE: RIGHT PRETIBIAL REGION
LOCATION SIMPLE: LEFT PRETIBIAL REGION
LOCATION SIMPLE: POSTERIOR NECK
LOCATION SIMPLE: RIGHT UPPER BACK
LOCATION SIMPLE: LEFT UPPER BACK

## 2022-02-23 ASSESSMENT — LOCATION DETAILED DESCRIPTION DERM
LOCATION DETAILED: PERIUMBILICAL SKIN
LOCATION DETAILED: LEFT KNEE
LOCATION DETAILED: LEFT DORSAL FOOT
LOCATION DETAILED: RIGHT SUPERIOR UPPER BACK
LOCATION DETAILED: RIGHT RIB CAGE
LOCATION DETAILED: EPIGASTRIC SKIN
LOCATION DETAILED: RIGHT ANTERIOR SHOULDER
LOCATION DETAILED: LEFT PROXIMAL PRETIBIAL REGION
LOCATION DETAILED: RIGHT CLAVICULAR NECK
LOCATION DETAILED: LEFT RIB CAGE
LOCATION DETAILED: RIGHT PROXIMAL PRETIBIAL REGION
LOCATION DETAILED: LEFT LATERAL TRAPEZIAL NECK
LOCATION DETAILED: INFERIOR THORACIC SPINE
LOCATION DETAILED: RIGHT KNEE
LOCATION DETAILED: LEFT MEDIAL UPPER BACK
LOCATION DETAILED: RIGHT POSTERIOR SHOULDER
LOCATION DETAILED: LEFT INFERIOR VERMILION LIP

## 2022-02-23 NOTE — PROCEDURE: MIPS QUALITY
Detail Level: Generalized
Quality 111:Pneumonia Vaccination Status For Older Adults: Pneumococcal Vaccination not Administered or Previously Received, Reason not Otherwise Specified
Quality 130: Documentation Of Current Medications In The Medical Record: Current Medications Documented
Quality 402: Tobacco Use And Help With Quitting Among Adolescents: Patient screened for tobacco and never smoked

## 2022-02-23 NOTE — PROCEDURE: BIOPSY BY SHAVE METHOD
Detail Level: Detailed
Depth Of Biopsy: dermis
Was A Bandage Applied: Yes
Size Of Lesion In Cm: 0.3
X Size Of Lesion In Cm: 0
Biopsy Type: H and E
Biopsy Method: Personna blade
Anesthesia Type: 1% lidocaine with epinephrine
Anesthesia Volume In Cc (Will Not Render If 0): 1
Hemostasis: Aluminum Chloride
Wound Care: Vaseline
Dressing: bandage
Destruction After The Procedure: No
Type Of Destruction Used: Curettage
Curettage Text: The wound bed was treated with curettage after the biopsy was performed.
Cryotherapy Text: The wound bed was treated with cryotherapy after the biopsy was performed.
Electrodesiccation Text: The wound bed was treated with electrodesiccation after the biopsy was performed.
Electrodesiccation And Curettage Text: The wound bed was treated with electrodesiccation and curettage after the biopsy was performed.
Silver Nitrate Text: The wound bed was treated with silver nitrate after the biopsy was performed.
Lab: [facility]
Lab Facility: [id number] [facility]
Consent: Written consent was obtained and risks were reviewed including but not limited to scarring, infection, bleeding, scabbing, incomplete removal, nerve damage and allergy to anesthesia.
Post-Care Instructions: I reviewed with the patient in detail post-care instructions. Patient is to keep the biopsy site dry overnight, and then apply bacitracin twice daily until healed. Patient may apply hydrogen peroxide soaks to remove any crusting.
Notification Instructions: Patient will be notified of biopsy results. However, patient instructed to call the office if not contacted within 2 weeks.
Billing Type: [employer]
Information: Selecting Yes will display possible errors in your note based on the variables you have selected. This validation is only offered as a suggestion for you. PLEASE NOTE THAT THE VALIDATION TEXT WILL BE REMOVED WHEN YOU FINALIZE YOUR NOTE. IF YOU WANT TO FAX A PRELIMINARY NOTE YOU WILL NEED TO TOGGLE THIS TO 'NO' IF YOU DO NOT WANT IT IN YOUR FAXED NOTE.
Lab: [id number]
Lab Facility: [id number]
Billing Type: Third-Party Bill

## 2022-02-23 NOTE — HPI: EVALUATION OF SKIN LESION(S)
What Type Of Note Output Would You Prefer (Optional)?: Bullet Format
How Severe Are Your Spot(S)?: mild
Have Your Spot(S) Been Treated In The Past?: has not been treated
Hpi Title: Evaluation of Skin Lesions
Location: Right upper arm
Year Removed: 2019

## 2022-03-09 ENCOUNTER — APPOINTMENT (RX ONLY)
Dept: URBAN - METROPOLITAN AREA CLINIC 114 | Facility: CLINIC | Age: 72
Setting detail: DERMATOLOGY
End: 2022-03-09

## 2022-03-09 ENCOUNTER — RX ONLY (OUTPATIENT)
Age: 72
Setting detail: RX ONLY
End: 2022-03-09

## 2022-03-09 DIAGNOSIS — L82.1 OTHER SEBORRHEIC KERATOSIS: ICD-10-CM

## 2022-03-09 DIAGNOSIS — L43.8 OTHER LICHEN PLANUS: ICD-10-CM | Status: INADEQUATELY CONTROLLED

## 2022-03-09 PROCEDURE — ? PRESCRIPTION

## 2022-03-09 PROCEDURE — ? PRESCRIPTION MEDICATION MANAGEMENT

## 2022-03-09 PROCEDURE — ? DEFER

## 2022-03-09 PROCEDURE — ? EDUCATIONAL RESOURCES PROVIDED

## 2022-03-09 PROCEDURE — ? COUNSELING

## 2022-03-09 PROCEDURE — 99214 OFFICE O/P EST MOD 30 MIN: CPT

## 2022-03-09 PROCEDURE — ? PATHOLOGY DISCUSSION

## 2022-03-09 RX ORDER — DEXAMETHASONE 0.5 MG/5ML
1 SOLUTION ORAL QD
Qty: 237 | Refills: 3 | Status: ERX | COMMUNITY
Start: 2022-03-09

## 2022-03-09 RX ORDER — NYSTATIN 100000 [USP'U]/ML
SUSPENSION ORAL TWICE A DAY
Qty: 100 | Refills: 3 | Status: ERX | COMMUNITY
Start: 2022-03-09

## 2022-03-09 RX ORDER — DEXAMETHASONE 0.5 MG/5ML
SOLUTION ORAL QD
Qty: 1 | Refills: 3 | Status: CANCELLED | COMMUNITY
Start: 2022-03-09

## 2022-03-09 RX ADMIN — NYSTATIN 1: 100000 SUSPENSION ORAL at 00:00

## 2022-03-09 RX ADMIN — DEXAMETHASONE: 0.5 SOLUTION ORAL at 00:00

## 2022-03-09 ASSESSMENT — LOCATION DETAILED DESCRIPTION DERM
LOCATION DETAILED: RIGHT VENTRAL PROXIMAL FOREARM
LOCATION DETAILED: RIGHT INFERIOR MUCOSAL LIP
LOCATION DETAILED: LEFT INFERIOR MUCOSAL LIP

## 2022-03-09 ASSESSMENT — LOCATION ZONE DERM
LOCATION ZONE: ARM
LOCATION ZONE: LIP

## 2022-03-09 ASSESSMENT — LOCATION SIMPLE DESCRIPTION DERM
LOCATION SIMPLE: LEFT INFERIOR MUCOSAL LIP
LOCATION SIMPLE: RIGHT FOREARM
LOCATION SIMPLE: RIGHT INFERIOR MUCOSAL LIP

## 2022-03-09 NOTE — PROCEDURE: DEFER
Detail Level: Simple
Reason To Defer Override: referral to outside physician.
Instructions (Optional): Recommended scheduling a visit with GI physician. \\nRecommended scheduling a visit with Dr. Elliot De La O with [address] [facility] .
Introduction Text (Please End With A Colon): Mary Guerrero

## 2022-03-09 NOTE — PROCEDURE: PRESCRIPTION MEDICATION MANAGEMENT
Initiate Treatment: dexamethasone 0.5 mg/5 mL oral solution\\nSi teaspoon hold and spit x 2 times per day. \\n\\nnystatin 100,000 unit/mL oral suspension\\nSi teaspoon hold and spit x 2 times per day. \\n\\nOTC Curcumin 2000MG (4 pills) once daily.
Detail Level: Zone
Render In Strict Bullet Format?: No

## 2022-07-14 ENCOUNTER — APPOINTMENT (RX ONLY)
Dept: URBAN - METROPOLITAN AREA CLINIC 114 | Facility: CLINIC | Age: 72
Setting detail: DERMATOLOGY
End: 2022-07-14

## 2022-07-14 DIAGNOSIS — Z41.9 ENCOUNTER FOR PROCEDURE FOR PURPOSES OTHER THAN REMEDYING HEALTH STATE, UNSPECIFIED: ICD-10-CM

## 2022-07-14 DIAGNOSIS — L57.0 ACTINIC KERATOSIS: ICD-10-CM

## 2022-07-14 DIAGNOSIS — L70.8 OTHER ACNE: ICD-10-CM

## 2022-07-14 DIAGNOSIS — L82.1 OTHER SEBORRHEIC KERATOSIS: ICD-10-CM

## 2022-07-14 DIAGNOSIS — L82.0 INFLAMED SEBORRHEIC KERATOSIS: ICD-10-CM

## 2022-07-14 DIAGNOSIS — L43.8 OTHER LICHEN PLANUS: ICD-10-CM

## 2022-07-14 DIAGNOSIS — D22 MELANOCYTIC NEVI: ICD-10-CM

## 2022-07-14 DIAGNOSIS — L72.0 EPIDERMAL CYST: ICD-10-CM

## 2022-07-14 PROBLEM — D22.39 MELANOCYTIC NEVI OF OTHER PARTS OF FACE: Status: ACTIVE | Noted: 2022-07-14

## 2022-07-14 PROCEDURE — ? COSMETIC CONSULTATION: CHEMICAL PEELS

## 2022-07-14 PROCEDURE — 17003 DESTRUCT PREMALG LES 2-14: CPT | Mod: 59

## 2022-07-14 PROCEDURE — ? ADDITIONAL NOTES

## 2022-07-14 PROCEDURE — 17000 DESTRUCT PREMALG LESION: CPT | Mod: 59

## 2022-07-14 PROCEDURE — ? RECOMMENDATIONS

## 2022-07-14 PROCEDURE — 99214 OFFICE O/P EST MOD 30 MIN: CPT | Mod: 25

## 2022-07-14 PROCEDURE — ? COUNSELING

## 2022-07-14 PROCEDURE — ? PRESCRIPTION MEDICATION MANAGEMENT

## 2022-07-14 PROCEDURE — ? COSMETIC CONSULTATION: GENERAL

## 2022-07-14 PROCEDURE — ? EDUCATIONAL RESOURCES PROVIDED

## 2022-07-14 PROCEDURE — ? PATHOLOGY DISCUSSION

## 2022-07-14 PROCEDURE — ? BENIGN DESTRUCTION

## 2022-07-14 PROCEDURE — 17110 DESTRUCTION B9 LES UP TO 14: CPT

## 2022-07-14 PROCEDURE — ? LIQUID NITROGEN

## 2022-07-14 ASSESSMENT — LOCATION DETAILED DESCRIPTION DERM
LOCATION DETAILED: RIGHT INFERIOR MUCOSAL LIP
LOCATION DETAILED: UPPER STERNUM
LOCATION DETAILED: LEFT DISTAL LATERAL CALF
LOCATION DETAILED: RIGHT DISTAL LATERAL CALF
LOCATION DETAILED: INFERIOR MID FOREHEAD
LOCATION DETAILED: MIDDLE STERNUM
LOCATION DETAILED: NASAL DORSUM
LOCATION DETAILED: RIGHT PROXIMAL POSTERIOR UPPER ARM
LOCATION DETAILED: RIGHT LATERAL CANTHUS
LOCATION DETAILED: LEFT POSTERIOR AXILLA
LOCATION DETAILED: NASAL SUPRATIP
LOCATION DETAILED: LEFT INFERIOR MUCOSAL LIP
LOCATION DETAILED: LEFT SUPERIOR FOREHEAD
LOCATION DETAILED: RIGHT LATERAL MALAR CHEEK
LOCATION DETAILED: LEFT PROXIMAL LATERAL CALF

## 2022-07-14 ASSESSMENT — LOCATION ZONE DERM
LOCATION ZONE: LIP
LOCATION ZONE: NOSE
LOCATION ZONE: TRUNK
LOCATION ZONE: ARM
LOCATION ZONE: FACE
LOCATION ZONE: LEG
LOCATION ZONE: AXILLAE
LOCATION ZONE: EYELID
LOCATION ZONE: FACE
LOCATION ZONE: NOSE

## 2022-07-14 ASSESSMENT — LOCATION SIMPLE DESCRIPTION DERM
LOCATION SIMPLE: RIGHT CALF
LOCATION SIMPLE: NOSE
LOCATION SIMPLE: LEFT POSTERIOR AXILLA
LOCATION SIMPLE: INFERIOR FOREHEAD
LOCATION SIMPLE: CHEST
LOCATION SIMPLE: RIGHT CHEEK
LOCATION SIMPLE: LEFT INFERIOR MUCOSAL LIP
LOCATION SIMPLE: NOSE
LOCATION SIMPLE: RIGHT INFERIOR MUCOSAL LIP
LOCATION SIMPLE: RIGHT POSTERIOR UPPER ARM
LOCATION SIMPLE: LEFT CALF
LOCATION SIMPLE: LEFT FOREHEAD
LOCATION SIMPLE: RIGHT EYELID

## 2022-07-14 NOTE — PROCEDURE: LIQUID NITROGEN
Include Z78.9 (Other Specified Conditions Influencing Health Status) As An Associated Diagnosis?: Yes
Number Of Freeze-Thaw Cycles: 2 freeze-thaw cycles
Consent: The patient's consent was obtained including but not limited to risks of crusting, scabbing, blistering, scarring, darker or lighter pigmentary change, recurrence, incomplete removal and infection.
Add 52 Modifier (Optional): no
Post-Care Instructions: I reviewed with the patient in detail post-care instructions. Patient is to wear sunprotection, and avoid picking at any of the treated lesions. Pt may apply Vaseline to crusted or scabbing areas.
Detail Level: Simple
Spray Paint Text: The liquid nitrogen was applied to the skin utilizing a spray paint frosting technique.
Medical Necessity Information: It is in your best interest to select a reason for this procedure from the list below. All of these items fulfill various CMS LCD requirements except the new and changing color options.
Duration Of Freeze Thaw-Cycle (Seconds): 5-10
Duration Of Freeze Thaw-Cycle (Seconds): 3
Detail Level: Detailed

## 2022-07-14 NOTE — PROCEDURE: RECOMMENDATIONS
Recommendation Preamble: The following recommendations were made during the visit: LHA Cleansing Gel, Silymarin CF, Blemish and Age Defense, Retinol 0.3 from [address] [place] [address].
Recommendation Override: The following recommendations were made during the visit: Dermaplaning, HydraFacial Signature with milia extractions on the body.
Detail Level: Zone
Render Risk Assessment In Note?: no

## 2022-07-14 NOTE — PROCEDURE: PRESCRIPTION MEDICATION MANAGEMENT
Detail Level: Zone
Render In Strict Bullet Format?: No
Plan: Patient was given a topical steroid from the [facility] Dr. Sarai Clarke prescribed for her and it has been working for her. Patient was advised to call back with the name of the topical steroid given to her.
Continue Regimen: dexamethasone 0.5 mg/5 mL oral solution\\nSi teaspoon hold and spit x 2 times per day. \\n\\nnystatin 100,000 unit/mL oral suspension\\nSi teaspoon hold and spit x 2 times per day. \\n\\nOTC Curcumin 2000MG (4 pills) once daily.

## 2022-07-14 NOTE — PROCEDURE: ADDITIONAL NOTES
Render Risk Assessment In Note?: no
Detail Level: Detailed
Additional Notes: Referring out to  for cosmetic extractions.

## 2022-07-14 NOTE — PROCEDURE: BENIGN DESTRUCTION
Add 52 Modifier (Optional): no
Render Note In Bullet Format When Appropriate: Yes
Consent: The patient's consent was obtained including but not limited to risks of crusting, scabbing, blistering, scarring, darker or lighter pigmentary change, recurrence, incomplete removal and infection.
Medical Necessity Clause: This procedure was medically necessary because the lesions that were treated were: cosmetic
Medical Necessity Information: It is in your best interest to select a reason for this procedure from the list below. All of these items fulfill various CMS LCD requirements except the new and changing color options.
Anesthesia Volume In Cc: 0.5
Detail Level: Detailed
Post-Care Instructions: I reviewed with the patient in detail post-care instructions. Patient is to wear sunprotection, and avoid picking at any of the treated lesions. Pt may apply Vaseline to crusted or scabbing areas.

## 2022-07-14 NOTE — PROCEDURE: COUNSELING
Detail Level: Zone
Topical Retinoids Recommendations: HydraFacial with  Nora Tavarez.
Detail Level: Detailed
Detail Level: Simple

## 2022-07-15 ENCOUNTER — APPOINTMENT (RX ONLY)
Dept: URBAN - METROPOLITAN AREA CLINIC 114 | Facility: CLINIC | Age: 72
Setting detail: DERMATOLOGY
End: 2022-07-15

## 2022-07-15 DIAGNOSIS — Z41.9 ENCOUNTER FOR PROCEDURE FOR PURPOSES OTHER THAN REMEDYING HEALTH STATE, UNSPECIFIED: ICD-10-CM

## 2022-07-15 PROCEDURE — ? HYDRAFACIAL

## 2022-07-15 PROCEDURE — ? COSMETIC EXTRACTIONS

## 2022-07-15 ASSESSMENT — LOCATION DETAILED DESCRIPTION DERM
LOCATION DETAILED: RIGHT PROXIMAL POSTERIOR UPPER ARM
LOCATION DETAILED: RIGHT CENTRAL MALAR CHEEK
LOCATION DETAILED: RIGHT AXILLARY VAULT
LOCATION DETAILED: SUPERIOR LUMBAR SPINE
LOCATION DETAILED: LEFT MEDIAL FOREHEAD
LOCATION DETAILED: LEFT INFERIOR MEDIAL FOREHEAD
LOCATION DETAILED: LEFT CENTRAL MALAR CHEEK

## 2022-07-15 ASSESSMENT — LOCATION SIMPLE DESCRIPTION DERM
LOCATION SIMPLE: LOWER BACK
LOCATION SIMPLE: RIGHT POSTERIOR UPPER ARM
LOCATION SIMPLE: LEFT CHEEK
LOCATION SIMPLE: RIGHT AXILLARY VAULT
LOCATION SIMPLE: RIGHT CHEEK
LOCATION SIMPLE: LEFT FOREHEAD

## 2022-07-15 ASSESSMENT — LOCATION ZONE DERM
LOCATION ZONE: AXILLAE
LOCATION ZONE: TRUNK
LOCATION ZONE: ARM
LOCATION ZONE: FACE

## 2022-07-15 NOTE — PROCEDURE: COSMETIC EXTRACTIONS
Render The Number Of Extractions: No
Post-Care Instructions: I reviewed with the patient in detail post-care instructions. Patient is to wear sunprotection, and avoid picking at any of the treated lesions. Pt may apply Vaseline to crusted or scabbing areas.
Detail Level: Zone
Anesthesia Volume In Cc: 0
Consent: The patient's consent was obtained including but not limited to risks of crusting, scabbing, blistering, scarring, darker or lighter pigmentary change, recurrence, incomplete removal and infection.

## 2022-07-15 NOTE — PROCEDURE: HYDRAFACIAL
Number Of Passes: 1
Indication: skin texture
Procedure: Exfoliation
Consent: Written consent obtained, risks reviewed including but not limited to crusting, scabbing, blistering, scarring, darker or lighter pigmentary change, bruising, and/or incomplete response.
Tip Override
Vacuum Pressure High Setting (Will Not Render If Set To 0): 0
Number Of Passes: 2
Tip: Hydropeel Tip, Teal
Post-Care Instructions: I reviewed with the patient in detail post-care instructions. Patient should stay away from the sun and wear sun protection until treated areas are fully healed.
Solution Override: Antiox +
Procedure: Extend and Protect
Solution: Activ-4
Vacuum Pressure Low Setting (Will Not Render If Set To 0): [address]
Tip: Hydropeel Tip, Clear
Location: face
Procedure: Peel
Tip Override: Hydropeel Tip, Teal and Purple
Vacuum Pressure Low Setting (Will Not Render If Set To 0): 16
Procedure: Extraction
Solution Override
Solution: GlySal 7.5%
Procedure: Fusion
Tip: Hydropeel Tip, Blue
Vacuum Pressure Low Setting (Will Not Render If Set To 0): [facility]
Solution: Beta-HD

## 2022-07-30 ENCOUNTER — TELEPHONE ENCOUNTER (OUTPATIENT)
Age: 72
End: 2022-07-30

## 2022-07-31 ENCOUNTER — TELEPHONE ENCOUNTER (OUTPATIENT)
Age: 72
End: 2022-07-31

## 2023-02-10 ENCOUNTER — OFFICE VISIT (OUTPATIENT)
Dept: URGENT CARE | Facility: URGENT CARE | Age: 73
End: 2023-02-10
Payer: MEDICARE

## 2023-02-10 VITALS
DIASTOLIC BLOOD PRESSURE: 75 MMHG | WEIGHT: 163 LBS | HEART RATE: 60 BPM | RESPIRATION RATE: 16 BRPM | TEMPERATURE: 97.2 F | OXYGEN SATURATION: 99 % | SYSTOLIC BLOOD PRESSURE: 148 MMHG | BODY MASS INDEX: 26.31 KG/M2

## 2023-02-10 DIAGNOSIS — R30.0 DYSURIA: Primary | ICD-10-CM

## 2023-02-10 LAB
ALBUMIN UR-MCNC: NEGATIVE MG/DL
APPEARANCE UR: CLEAR
BILIRUB UR QL STRIP: NEGATIVE
COLOR UR AUTO: YELLOW
GLUCOSE UR STRIP-MCNC: NEGATIVE MG/DL
HGB UR QL STRIP: NEGATIVE
KETONES UR STRIP-MCNC: NEGATIVE MG/DL
LEUKOCYTE ESTERASE UR QL STRIP: NEGATIVE
NITRATE UR QL: NEGATIVE
PH UR STRIP: 8.5 [PH] (ref 5–7)
SP GR UR STRIP: 1.01 (ref 1–1.03)
UROBILINOGEN UR STRIP-ACNC: 0.2 E.U./DL

## 2023-02-10 PROCEDURE — 99202 OFFICE O/P NEW SF 15 MIN: CPT | Performed by: PHYSICIAN ASSISTANT

## 2023-02-10 PROCEDURE — 81003 URINALYSIS AUTO W/O SCOPE: CPT | Performed by: PHYSICIAN ASSISTANT

## 2023-02-10 RX ORDER — PROGESTERONE 100 MG/1
100 CAPSULE ORAL DAILY
COMMUNITY

## 2023-02-10 RX ORDER — ESTRADIOL 0.1 MG/G
2 CREAM VAGINAL
COMMUNITY

## 2023-02-10 RX ORDER — LISINOPRIL 5 MG/1
5 TABLET ORAL DAILY
COMMUNITY
Start: 2022-11-04 | End: 2024-03-06

## 2023-02-10 RX ORDER — LAMOTRIGINE 25 MG/1
75 TABLET ORAL DAILY
COMMUNITY
End: 2024-03-06

## 2023-02-10 RX ORDER — HYDROCORTISONE 10 MG/1
10 TABLET ORAL DAILY
COMMUNITY
End: 2024-03-06

## 2023-02-10 NOTE — PATIENT INSTRUCTIONS
Results for orders placed or performed in visit on 02/10/23   UA reflex to Microscopic and Culture     Status: Abnormal    Specimen: Urine, Midstream   Result Value Ref Range    Color Urine Yellow Colorless, Straw, Light Yellow, Yellow    Appearance Urine Clear Clear    Glucose Urine Negative Negative mg/dL    Bilirubin Urine Negative Negative    Ketones Urine Negative Negative mg/dL    Specific Gravity Urine 1.015 1.003 - 1.035    Blood Urine Negative Negative    pH Urine 8.5 (H) 5.0 - 7.0    Protein Albumin Urine Negative Negative mg/dL    Urobilinogen Urine 0.2 0.2, 1.0 E.U./dL    Nitrite Urine Negative Negative    Leukocyte Esterase Urine Negative Negative    Narrative    Microscopic not indicated

## 2023-02-10 NOTE — PROGRESS NOTES
SUBJECTIVE: Shahrzad Myles is a 72 year old female who complains of urinary frequency, urgency and dysuria x 14 days, without flank pain, fever, chills, or abnormal vaginal discharge or bleeding. She does use topical esterogen cream for urethreal spasms    OBJECTIVE: Appears well, in no apparent distress.  CVS exam: S1, S2 normal, no murmur, click, rub or gallop, regular rate and rhythm, chest is clear without rales or wheezing.  The abdomen is soft without tenderness, guarding, mass, rebound or no CVA tenderness  Urine dipstick shows negative for all components.      Results for orders placed or performed in visit on 02/10/23   UA reflex to Microscopic and Culture     Status: Abnormal    Specimen: Urine, Midstream   Result Value Ref Range    Color Urine Yellow Colorless, Straw, Light Yellow, Yellow    Appearance Urine Clear Clear    Glucose Urine Negative Negative mg/dL    Bilirubin Urine Negative Negative    Ketones Urine Negative Negative mg/dL    Specific Gravity Urine 1.015 1.003 - 1.035    Blood Urine Negative Negative    pH Urine 8.5 (H) 5.0 - 7.0    Protein Albumin Urine Negative Negative mg/dL    Urobilinogen Urine 0.2 0.2, 1.0 E.U./dL    Nitrite Urine Negative Negative    Leukocyte Esterase Urine Negative Negative    Narrative    Microscopic not indicated       ASSESSMENT:     Diagnosis Comments   1. Dysuria  UA reflex to Microscopic and Culture             PLAN:  Call or return to clinic prn if these symptoms worsen or fail to improve as anticipated.

## 2023-02-27 ENCOUNTER — TELEPHONE (OUTPATIENT)
Dept: NEPHROLOGY | Facility: CLINIC | Age: 73
End: 2023-02-27
Payer: MEDICARE

## 2023-02-27 DIAGNOSIS — E55.9 VITAMIN D DEFICIENCY, UNSPECIFIED: ICD-10-CM

## 2023-02-27 DIAGNOSIS — R79.89 ELEVATED SERUM CREATININE: Primary | ICD-10-CM

## 2023-02-27 NOTE — TELEPHONE ENCOUNTER
[facility] Call Center    Phone Message    May a detailed message be left on voicemail: yes     Reason for Call: Order(s): Other:   Reason for requested: Labs  Date needed: 4/10/23  Provider name: Dr. Butts    Labs in [place]      Action Taken: Message routed to:  [facility]    Travel Screening: Not Applicable

## 2023-03-22 NOTE — TELEPHONE ENCOUNTER
DIAGNOSIS: CKD, med recs in epic   DATE RECEIVED: 04.20.2023   NOTES STATUS DETAILS   OFFICE NOTE from referring provider     OFFICE NOTE from other specialist  Care Everywhere 03.06.2023 Radha Shephred PA-C  [facility]   *Only VASCULITIS or LUPUS gather office notes for the following     *PULMONARY       *ENT     *DERMATOLOGY     *RHEUMATOLOGY     DISCHARGE SUMMARY from hospital     DISCHARGE REPORT from the ER     MEDICATION LIST Care Everywhere    IMAGING  (NEED IMAGES AND REPORTS)     KIDNEY CT SCAN     KIDNEY ULTRASOUND     MR ABDOMEN     NUCLEAR MEDICINE RENAL     LABS     CBC Care Everywhere 02.20.2020   CMP Care Everywhere 02.20.2020   BMP Care Everywhere 02.10.2020   UA Care Everywhere 06.21.2019   URINE PROTEIN Care Everywhere 06.21.2019   RENAL PANEL     BIOPSY     KIDNEY BIOPSY

## 2023-04-02 ENCOUNTER — HEALTH MAINTENANCE LETTER (OUTPATIENT)
Age: 73
End: 2023-04-02

## 2023-04-13 ENCOUNTER — LAB (OUTPATIENT)
Dept: LAB | Facility: CLINIC | Age: 73
End: 2023-04-13
Payer: MEDICARE

## 2023-04-13 DIAGNOSIS — R79.89 ELEVATED SERUM CREATININE: ICD-10-CM

## 2023-04-13 DIAGNOSIS — E55.9 VITAMIN D DEFICIENCY, UNSPECIFIED: ICD-10-CM

## 2023-04-13 LAB
ALBUMIN SERPL BCG-MCNC: 4.6 G/DL (ref 3.5–5.2)
ALBUMIN UR-MCNC: NEGATIVE MG/DL
ANION GAP SERPL CALCULATED.3IONS-SCNC: 15 MMOL/L (ref 7–15)
APPEARANCE UR: CLEAR
BACTERIA #/AREA URNS HPF: ABNORMAL /HPF
BILIRUB UR QL STRIP: NEGATIVE
BUN SERPL-MCNC: 16.4 MG/DL (ref 8–23)
CALCIUM SERPL-MCNC: 9.7 MG/DL (ref 8.8–10.2)
CHLORIDE SERPL-SCNC: 97 MMOL/L (ref 98–107)
COLOR UR AUTO: YELLOW
CREAT SERPL-MCNC: 1.15 MG/DL (ref 0.51–0.95)
DEPRECATED HCO3 PLAS-SCNC: 24 MMOL/L (ref 22–29)
ERYTHROCYTE [DISTWIDTH] IN BLOOD BY AUTOMATED COUNT: 13 % (ref 10–15)
FERRITIN SERPL-MCNC: 98 NG/ML (ref 11–328)
GFR SERPL CREATININE-BSD FRML MDRD: 51 ML/MIN/1.73M2
GLUCOSE SERPL-MCNC: 90 MG/DL (ref 70–99)
GLUCOSE UR STRIP-MCNC: NEGATIVE MG/DL
HCT VFR BLD AUTO: 40.3 % (ref 35–47)
HGB BLD-MCNC: 12.7 G/DL (ref 11.7–15.7)
HGB UR QL STRIP: NEGATIVE
IRON BINDING CAPACITY (ROCHE): 277 UG/DL (ref 240–430)
IRON SATN MFR SERPL: 16 % (ref 15–46)
IRON SERPL-MCNC: 45 UG/DL (ref 37–145)
KETONES UR STRIP-MCNC: NEGATIVE MG/DL
LEUKOCYTE ESTERASE UR QL STRIP: ABNORMAL
MCH RBC QN AUTO: 28.2 PG (ref 26.5–33)
MCHC RBC AUTO-ENTMCNC: 31.5 G/DL (ref 31.5–36.5)
MCV RBC AUTO: 90 FL (ref 78–100)
NITRATE UR QL: NEGATIVE
PH UR STRIP: 7 [PH] (ref 5–7)
PHOSPHATE SERPL-MCNC: 3.7 MG/DL (ref 2.5–4.5)
PLATELET # BLD AUTO: 168 10E3/UL (ref 150–450)
POTASSIUM SERPL-SCNC: 3.7 MMOL/L (ref 3.4–5.3)
PTH-INTACT SERPL-MCNC: 35 PG/ML (ref 15–65)
RBC # BLD AUTO: 4.5 10E6/UL (ref 3.8–5.2)
RBC #/AREA URNS AUTO: ABNORMAL /HPF
SODIUM SERPL-SCNC: 136 MMOL/L (ref 136–145)
SP GR UR STRIP: 1.01 (ref 1–1.03)
SQUAMOUS #/AREA URNS AUTO: ABNORMAL /LPF
UROBILINOGEN UR STRIP-ACNC: 0.2 E.U./DL
WBC # BLD AUTO: 4.4 10E3/UL (ref 4–11)
WBC #/AREA URNS AUTO: ABNORMAL /HPF

## 2023-04-13 PROCEDURE — 36415 COLL VENOUS BLD VENIPUNCTURE: CPT

## 2023-04-13 PROCEDURE — 84156 ASSAY OF PROTEIN URINE: CPT

## 2023-04-13 PROCEDURE — 85027 COMPLETE CBC AUTOMATED: CPT

## 2023-04-13 PROCEDURE — 82728 ASSAY OF FERRITIN: CPT

## 2023-04-13 PROCEDURE — 80069 RENAL FUNCTION PANEL: CPT

## 2023-04-13 PROCEDURE — 83540 ASSAY OF IRON: CPT

## 2023-04-13 PROCEDURE — 83970 ASSAY OF PARATHORMONE: CPT

## 2023-04-13 PROCEDURE — 82306 VITAMIN D 25 HYDROXY: CPT

## 2023-04-13 PROCEDURE — 81001 URINALYSIS AUTO W/SCOPE: CPT

## 2023-04-13 PROCEDURE — 83550 IRON BINDING TEST: CPT

## 2023-04-14 LAB
ALBUMIN MFR UR ELPH: <6 MG/DL (ref 1–14)
CREAT UR-MCNC: 24.9 MG/DL
DEPRECATED CALCIDIOL+CALCIFEROL SERPL-MC: 58 UG/L (ref 20–75)
PROT/CREAT 24H UR: NORMAL MG/G{CREAT}

## 2023-04-20 ENCOUNTER — PRE VISIT (OUTPATIENT)
Dept: NEPHROLOGY | Facility: CLINIC | Age: 73
End: 2023-04-20
Payer: MEDICARE

## 2023-04-20 ENCOUNTER — VIRTUAL VISIT (OUTPATIENT)
Dept: NEPHROLOGY | Facility: CLINIC | Age: 73
End: 2023-04-20
Attending: INTERNAL MEDICINE
Payer: MEDICARE

## 2023-04-20 DIAGNOSIS — N18.30 STAGE 3 CHRONIC KIDNEY DISEASE, UNSPECIFIED WHETHER STAGE 3A OR 3B CKD (H): ICD-10-CM

## 2023-04-20 DIAGNOSIS — E21.0 PRIMARY HYPERPARATHYROIDISM (H): Primary | ICD-10-CM

## 2023-04-20 PROCEDURE — 99204 OFFICE O/P NEW MOD 45 MIN: CPT | Mod: VID | Performed by: INTERNAL MEDICINE

## 2023-04-20 NOTE — NURSING NOTE
Is the patient currently in the state of [place]? YES    Visit mode:VIDEO    If the visit is dropped, the patient can be reconnected by: VIDEO VISIT: Send to e-mail at: [email or web address]    Will anyone else be joining the visit? NO      How would you like to obtain your AVS? MyChart    Are changes needed to the allergy or medication list? NO    Reason for visit: New Patient

## 2023-04-20 NOTE — PROGRESS NOTES
Nephrology Outpatient Consult Note    Requesting Provider  Referred MD Misael  No address on file    Chief Complaint/Reason for Visit  Chronic kidney disease. History of primary hyperparathyroidism status post parathyroidectomy.    History of Present Illness  This is 72-year-old female who is self referred to our clinic for further work-up and management of chronic kidney disease.  The patient's past medical history is notable for hypertension.  With regards to the patient's kidney function, we have laboratory data going back to 2018.  Back then, the patient's serum creatinine was 1 mg/dL.    The patient is recently moved from [place] back to [place].  She stated that she was evaluated for chronic kidney disease overall [place].  She indicated that she was ultimately diagnosed with the primary hyperparathyroidism for which she underwent subtotal parathyroidectomy back in 2021.  I reviewed her chart through care everywhere.  I saw somebody from [facility].  However, I was not able to find detailed notes outlining the details of her work-up and surgery.  However, I note that her kidney function has overall been stable over the past several years with a serum creatinine baseline around 1.1 mg/dL.    Her most recent laboratory evaluation was earlier this month.  She had a renal panel that showed a creatinine of 1.1 mg/dL.  Her serum sodium, potassium, chloride, bicarbonate, calcium, phosphorus were normal.  Her serum albumin is normal.  Iron studies showed a borderline iron saturation of 60%.  However, her hemoglobin is normal.  Urinalysis is negative for protein, red cells, white cells.  PTH level is normal.  Urine protein to creatinine ratio is negative for proteinuria.  There is no previous abdominal imaging that I was able to review.    Overall, the patient feels well from a health perspective.  She denies any acute symptoms today.  She denies fevers or chills.  She denies a skin rash or joint pain.   She denies chest pain or difficulty breathing.  She has no abdominal pain.  She has no urinary symptoms.  She denies lower extremity edema.    The following portions of the patient's history were reviewed and updated as appropriate: allergies, current medications, past family history, past medical history, past social history, past surgical history and problem list.    Subjective   Review of Systems  A comprehensive review of systems was performed. Pertinent positives and negatives are described above.    Past Medical/Surgical History  Patient  has a past medical history of HTN (hypertension).  Patient  has a past surgical history that includes Arthroplasty knee (Left) and Arthroplasty knee (Right, 2/20/2018).    Social History  Patient  reports that she has never smoked. She has never used smokeless tobacco. She reports current alcohol use. She reports that she does not use drugs.    Family History  family history includes Cerebrovascular Disease in her father; Diabetes in her father; Heart Surgery in her father; Hyperlipidemia in her mother; Hypertension in her mother.    Objective   Vital Signs  There were no vitals taken for this visit. There is no height or weight on file to calculate BMI.    Physical Examination  Not performed as this is a virtual visit.    Lab Results   Component Value Date    WBC 4.4 04/13/2023    HGB 12.7 04/13/2023    HCT 40.3 04/13/2023    MCV 90 04/13/2023     04/13/2023     04/13/2023    BUN 16.4 04/13/2023    ANIONGAP 15 04/13/2023    ALBUMIN 4.6 04/13/2023     Lab Results   Component Value Date    UROBILINOGEN 0.2 04/13/2023     Current Outpatient Medications   Medication     CRANBERRY PO     estradiol (ESTRACE) 0.1 MG/GM vaginal cream     hydrocortisone (CORTEF) 5 MG half-tab     lamoTRIgine (LAMICTAL) 25 MG tablet     lisinopril (ZESTRIL) 5 MG tablet     MAGNESIUM PO     Omega-3 Fatty Acids (FISH OIL PO)     Probiotic Product (ALIGN PO)     progesterone (PROMETRIUM) 100  MG capsule     No current facility-administered medications for this visit.     Assessment & Plan   Patient Active Problem List   Diagnosis     Status post total knee replacement, right     Primary hyperparathyroidism (H)     Stage 3 chronic kidney disease, unspecified whether stage 3a or 3b CKD (H)     The patient has chronic kidney disease stage IIIa.  This is possibly related to hyperparathyroidism.  The patient was diagnosed with primary hyperparathyroidism in [place] and underwent a subtotal parathyroidectomy in 2021.  The detailed records are not available to me.    At this point, the patient's kidney function is overall stable.  His serum creatinine is 1.1 mg/dL which has been her baseline for several years.  His serum electrolytes are normal.  Her serum PTH is normal.  Her hemoglobin is normal.  Her urinalysis is normal.    In order to complete the work-up of chronic kidney disease I think it would be reasonable to obtain a kidney ultrasound.  However, the patient does not need any additional work-up at this point.    The patient indicated that her blood pressure is usually normal.  However, he noticed one blood pressure reading that was elevated a month ago measuring 148/75.  I instructed the patient to check her blood pressure at home and to call my office if her blood pressure is elevated.    I emphasized to the patient the importance of healthy life habits in order to keep her kidneys healthy.  I think she would benefit from a referral to the CKD educational class.  The patient asked me about several supplements and whether she should take them.  I indicated that she would not need any daily supplements besides a daily multivitamin.    My recommendations are the following:  -Obtain a kidney ultrasound.  - Check your blood pressure at home. Goal blood pressure is 130/80. Call my office if your blood pressure readings are running higher than that.  - Referral to the CKD educational class.  - Return to  Nephrology Clinic in 1 year to review your progress.  Check a routine CKD laboratory panel prior to the appointment    Total time was of this encounter on the date of service was 50 minutes. All questions were answered to the patient's satisfaction.  Chart documentation was completed, in part, with [employer] voice-recognition software. Even though reviewed, some grammatical, spelling, and word errors may remain.    Telemedicine Visit Addendum:  Consultation provided at the request of above provider for advice regarding the diagnosis and treatment of patient's condition. The patient's condition can be safely assessed via telemedicine. Patient has agreed to receiving services via telemedicine technology. Date of service is 04/22/23. Time Service Began: 4:00 PM. Time Service Ended: 4:40 PM. Originating Location (pt. Location): Home. Distant Location (provider location):  [facility].Reason that telemedicine is appropriate: Patient unable to travel. Mode of transmission: Secure real time interactive audio and visual telecommunication system  [employer]    Orders placed today:  Orders Placed This Encounter   Procedures     US Renal Complete Non-Vascular     CKD Education Referral

## 2023-04-20 NOTE — LETTER
4/20/2023       RE: Shahrzad Myles  [address]    [address]  [place] [facility] [place] [facility] [address]     Dear Colleague,    Thank you for referring your patient, Shahrzad Myles, to the [facility] NEPHROLOG[facility]IC [place] at [facility]. Please see a copy of my visit note below.    Nephrology Outpatient Consult Note    Requesting Provider  Referred Self, MD  No address on file    Chief Complaint/Reason for Visit  Chronic kidney disease. History of primary hyperparathyroidism status post parathyroidectomy.    History of Present Illness  This is 72-year-old female who is self referred to our clinic for further work-up and management of chronic kidney disease.  The patient's past medical history is notable for hypertension.  With regards to the patient's kidney function, we have laboratory data going back to 2018.  Back then, the patient's serum creatinine was 1 mg/dL.    The patient is recently moved from [place] back to [place].  She stated that she was evaluated for chronic kidney disease overall [place].  She indicated that she was ultimately diagnosed with the primary hyperparathyroidism for which she underwent subtotal parathyroidectomy back in 2021.  I reviewed her chart through care everywhere.  I saw somebody from [facility].  However, I was not able to find detailed notes outlining the details of her work-up and surgery.  However, I note that her kidney function has overall been stable over the past several years with a serum creatinine baseline around 1.1 mg/dL.    Her most recent laboratory evaluation was earlier this month.  She had a renal panel that showed a creatinine of 1.1 mg/dL.  Her serum sodium, potassium, chloride, bicarbonate, calcium, phosphorus were normal.  Her serum albumin is normal.  Iron studies showed a borderline iron saturation of 60%.  However, her hemoglobin is normal.  Urinalysis is negative for protein, red cells, white cells.  PTH  level is normal.  Urine protein to creatinine ratio is negative for proteinuria.  There is no previous abdominal imaging that I was able to review.    Overall, the patient feels well from a health perspective.  She denies any acute symptoms today.  She denies fevers or chills.  She denies a skin rash or joint pain.  She denies chest pain or difficulty breathing.  She has no abdominal pain.  She has no urinary symptoms.  She denies lower extremity edema.    The following portions of the patient's history were reviewed and updated as appropriate: allergies, current medications, past family history, past medical history, past social history, past surgical history and problem list.    Subjective   Review of Systems  A comprehensive review of systems was performed. Pertinent positives and negatives are described above.    Past Medical/Surgical History  Patient  has a past medical history of HTN (hypertension).  Patient  has a past surgical history that includes Arthroplasty knee (Left) and Arthroplasty knee (Right, 2/20/2018).    Social History  Patient  reports that she has never smoked. She has never used smokeless tobacco. She reports current alcohol use. She reports that she does not use drugs.    Family History  family history includes Cerebrovascular Disease in her father; Diabetes in her father; Heart Surgery in her father; Hyperlipidemia in her mother; Hypertension in her mother.    Objective   Vital Signs  There were no vitals taken for this visit. There is no height or weight on file to calculate BMI.    Physical Examination  Not performed as this is a virtual visit.    Lab Results   Component Value Date    WBC 4.4 04/13/2023    HGB 12.7 04/13/2023    HCT 40.3 04/13/2023    MCV 90 04/13/2023     04/13/2023     04/13/2023    BUN 16.4 04/13/2023    ANIONGAP 15 04/13/2023    ALBUMIN 4.6 04/13/2023     Lab Results   Component Value Date    UROBILINOGEN 0.2 04/13/2023     Current Outpatient Medications    Medication    CRANBERRY PO    estradiol (ESTRACE) 0.1 MG/GM vaginal cream    hydrocortisone (CORTEF) 5 MG half-tab    lamoTRIgine (LAMICTAL) 25 MG tablet    lisinopril (ZESTRIL) 5 MG tablet    MAGNESIUM PO    Omega-3 Fatty Acids (FISH OIL PO)    Probiotic Product (ALIGN PO)    progesterone (PROMETRIUM) 100 MG capsule     No current facility-administered medications for this visit.     Assessment & Plan   Patient Active Problem List   Diagnosis    Status post total knee replacement, right    Primary hyperparathyroidism (H)    Stage 3 chronic kidney disease, unspecified whether stage 3a or 3b CKD (H)     The patient has chronic kidney disease stage IIIa.  This is possibly related to hyperparathyroidism.  The patient was diagnosed with primary hyperparathyroidism in [place] and underwent a subtotal parathyroidectomy in 2021.  The detailed records are not available to me.    At this point, the patient's kidney function is overall stable.  His serum creatinine is 1.1 mg/dL which has been her baseline for several years.  His serum electrolytes are normal.  Her serum PTH is normal.  Her hemoglobin is normal.  Her urinalysis is normal.    In order to complete the work-up of chronic kidney disease I think it would be reasonable to obtain a kidney ultrasound.  However, the patient does not need any additional work-up at this point.    The patient indicated that her blood pressure is usually normal.  However, he noticed one blood pressure reading that was elevated a month ago measuring 148/75.  I instructed the patient to check her blood pressure at home and to call my office if her blood pressure is elevated.    I emphasized to the patient the importance of healthy life habits in order to keep her kidneys healthy.  I think she would benefit from a referral to the CKD educational class.  The patient asked me about several supplements and whether she should take them.  I indicated that she would not need any daily supplements  besides a daily multivitamin.    My recommendations are the following:  -Obtain a kidney ultrasound.  - Check your blood pressure at home. Goal blood pressure is 130/80. Call my office if your blood pressure readings are running higher than that.  - Referral to the CKD educational class.  - Return to Nephrology Clinic in 1 year to review your progress.  Check a routine CKD laboratory panel prior to the appointment    Total time was of this encounter on the date of service was 50 minutes. All questions were answered to the patient's satisfaction.  Chart documentation was completed, in part, with "[employer] voice-recognition software. Even though reviewed, some grammatical, spelling, and word errors may remain.    Telemedicine Visit Addendum:  Consultation provided at the request of above provider for advice regarding the diagnosis and treatment of patient's condition. The patient's condition can be safely assessed via telemedicine. Patient has agreed to receiving services via telemedicine technology. Date of service is 04/22/23. Time Service Began: 4:00 PM. Time Service Ended: 4:40 PM. Originating Location (pt. Location): Home. Distant Location (provider location):  [facility].Reason that telemedicine is appropriate: Patient unable to travel. Mode of transmission: Secure real time interactive audio and visual telecommunication system  [employer]    Orders placed today:  Orders Placed This Encounter   Procedures    US Renal Complete Non-Vascular    CKD Education Referral       Sincerely,    Douglas Butts MD

## 2023-04-21 NOTE — PATIENT INSTRUCTIONS
It was a pleasure taking care of you today.  I've included a brief summary of our discussion and care plan from today's visit below.  Please review this information with your primary care provider.    My recommendations are summarized as follows:  - Obtain a kidney ultrasound  - Check your blood pressure at home. Goal blood pressure is 130/80. Call my office if your blood pressure readings are running higher than that.  - Referral to the CKD educational class.  - Return to Nephrology Clinic in 1 year to review your progress.  Check a routine CKD laboratory panel prior to the appointment    Who do I call with any questions after my visit?  Please be in touch if there are any further questions that arise following today's visit.  There are multiple ways to contact your nephrology care team.      During business hours, you may reach your Nephrology Care Team or schedule or reschedule an appointment or lab at [phone].      If you need to schedule imaging, please call [phone].   To schedule a COVID test, please call [phone].    You can always send a secure message through [employer]. [employer] Code messages are answered by your nurse or doctor typically within 24-48 hours. Please allow extra time on weekends and holidays.      For urgent/emergent questions after business hours, you may reach the on-call Nephrology Fellow by contacting the [facility]  at [phone].     How will I get the results of any tests ordered?    You will receive all of your results.  If you have signed up for Particle Code, any tests ordered at your visit will be available to you once resulted on Particle Code. Typically the physician reviews them and may or may not make further recommendations. If there are urgent results that require a change in your care plan, your physician or nurse will call you to discuss the next steps. If you are not on [email or web address], a letter may be generated and mailed to you with your  results.      Sincerely,    Douglas Butts MD  [facility]  Division of Nephrology and Hypertension

## 2023-04-25 ENCOUNTER — ANCILLARY PROCEDURE (OUTPATIENT)
Dept: ULTRASOUND IMAGING | Facility: CLINIC | Age: 73
End: 2023-04-25
Attending: INTERNAL MEDICINE
Payer: MEDICARE

## 2023-04-25 DIAGNOSIS — N18.30 STAGE 3 CHRONIC KIDNEY DISEASE, UNSPECIFIED WHETHER STAGE 3A OR 3B CKD (H): ICD-10-CM

## 2023-04-25 DIAGNOSIS — E21.0 PRIMARY HYPERPARATHYROIDISM (H): ICD-10-CM

## 2023-04-25 PROCEDURE — 76770 US EXAM ABDO BACK WALL COMP: CPT

## 2023-07-24 ENCOUNTER — TELEPHONE (OUTPATIENT)
Dept: NEPHROLOGY | Facility: CLINIC | Age: 73
End: 2023-07-24
Payer: MEDICARE

## 2023-07-24 NOTE — TELEPHONE ENCOUNTER
Spoke to patient and scheduled an appointment for Dr. Butts on 04/11/2023 with labs prior // 07/24/2023 MCE

## 2024-01-17 ENCOUNTER — HOSPITAL ENCOUNTER (OUTPATIENT)
Dept: MAMMOGRAPHY | Facility: CLINIC | Age: 74
Discharge: HOME | End: 2024-01-17
Attending: FAMILY MEDICINE | Admitting: FAMILY MEDICINE
Payer: MEDICARE

## 2024-01-17 DIAGNOSIS — Z12.31 VISIT FOR SCREENING MAMMOGRAM: ICD-10-CM

## 2024-01-17 PROCEDURE — 77063 BREAST TOMOSYNTHESIS BI: CPT

## 2024-03-06 ENCOUNTER — OFFICE VISIT (OUTPATIENT)
Dept: URGENT CARE | Facility: URGENT CARE | Age: 74
End: 2024-03-06
Payer: MEDICARE

## 2024-03-06 VITALS
OXYGEN SATURATION: 97 % | HEART RATE: 65 BPM | TEMPERATURE: 98.3 F | DIASTOLIC BLOOD PRESSURE: 63 MMHG | SYSTOLIC BLOOD PRESSURE: 135 MMHG

## 2024-03-06 DIAGNOSIS — J01.01 ACUTE RECURRENT MAXILLARY SINUSITIS: Primary | ICD-10-CM

## 2024-03-06 PROCEDURE — 99213 OFFICE O/P EST LOW 20 MIN: CPT | Performed by: FAMILY MEDICINE

## 2024-03-06 RX ORDER — DOXYCYCLINE HYCLATE 100 MG
100 TABLET ORAL 2 TIMES DAILY
Qty: 14 TABLET | Refills: 0 | Status: SHIPPED | OUTPATIENT
Start: 2024-03-06 | End: 2024-03-13

## 2024-03-06 NOTE — PROGRESS NOTES
SUBJECTIVE:  Shahrzad Myles is a 73 year old female who complains of:  Chief Complaint   Patient presents with    Urgent Care     Sinusitis- facial pressure, HA x few weeks - worse on left side of face        Duration: >21 days   Severity: mild/moderate  Modifiers: OTC meds not helpful  Trend of symptoms: worsening  Congestion is associated with facial pressure and headache  Other review of symptoms include: nonproductive cough  Denies These symptoms: fever, ear pain, myalgia  History of: recurrent sinusitis  Ill contacts: others at home with similar illness    Review of symptoms except as noted in the HPI is otherwise negative.    MEDICATIONS  Current Outpatient Medications   Medication Sig Dispense Refill    doxycycline hyclate (VIBRA-TABS) 100 MG tablet Take 1 tablet (100 mg) by mouth 2 times daily for 7 days 14 tablet 0    CRANBERRY PO Take 1 tablet by mouth every morning      estradiol (ESTRACE) 0.1 MG/GM vaginal cream Place 2 g vaginally      Omega-3 Fatty Acids (FISH OIL PO) Take 2 capsules by mouth daily      Probiotic Product (ALIGN PO) Take 1 tablet by mouth every morning      progesterone (PROMETRIUM) 100 MG capsule Take 100 mg by mouth daily       Allergies:    Allergies   Allergen Reactions    Codeine     Penicillin G     Sulfa Antibiotics     Tape [Adhesive Tape]        SOCIAL   reports that she has never smoked. She has never used smokeless tobacco.    OBJECTIVE:  /63   Pulse 65   Temp 98.3  F (36.8  C) (Tympanic)   SpO2 97%   General appearance: healthy, alert and cooperative.  Left Ear: normal; external ear canal and TM clear, nontender.  Right Ear: normal; external ear canal and TM clear, nontender.  Nose: mucosal erythema  Sinuses: maxillary tenderness bilaterally  Oropharynx: normal pharynx and buccal mucosa. Dental hygeine adequate.    Neck: normal; supple with no masses or nodes.      ASSESSMENT/PLAN:    ICD-10-CM    1. Acute recurrent maxillary sinusitis  J01.01 doxycycline  hyclate (VIBRA-TABS) 100 MG tablet        Most likely to be viral sinusitis but may have a bacterial component.  Start with decongestants and sinus rinsing,     try antibiotics only if symptoms worsen after trying that or the patient develops a fever      Instructions on use of tylenol or ibuprofen for relief of pain and fever we given.    Call or return to clinic if these symptoms worsen or fail to improve as anticipated.  Yoan Sosa MD MPH       Statement Selected

## 2024-06-08 ENCOUNTER — HEALTH MAINTENANCE LETTER (OUTPATIENT)
Age: 74
End: 2024-06-08

## 2024-10-16 ENCOUNTER — TRANSFERRED RECORDS (OUTPATIENT)
Dept: HEALTH INFORMATION MANAGEMENT | Facility: CLINIC | Age: 74
End: 2024-10-16

## 2024-10-16 LAB — PHQ9 SCORE: 0

## 2024-10-31 ENCOUNTER — TRANSFERRED RECORDS (OUTPATIENT)
Dept: HEALTH INFORMATION MANAGEMENT | Facility: CLINIC | Age: 74
End: 2024-10-31

## 2024-10-31 LAB — PHQ9 SCORE: 0

## 2024-12-18 ENCOUNTER — TELEPHONE (OUTPATIENT)
Dept: UROLOGY | Facility: CLINIC | Age: 74
End: 2024-12-18
Payer: MEDICARE

## 2024-12-18 NOTE — TELEPHONE ENCOUNTER
LVM for Pt - E-Form request for an appointment with Urology.     Patient Details  Patient's Full Name  Domonique Villeda  Patient's Date of Birth  09/20/1970  Patient's Phone Number  [phone]  Patient's Email ID  [email or web address]  Has the patient visited [facility] in the past 3 years?  Yes  Address Details  Address  [address] [place] [address] [place]  [address]  Appointment Preferences  Reason for appointment  My symptoms is urinary incontinence  Preferred Provider  Vaishali Del Cid  Preferred Visit Type  In-person   Services   Do you need an  for your appointment?  No  Billing Preference  Which billing situation applies to the patient?  Patient has insurance  Insurance Information  Insurance Provider Name  [employer]  Member ID/ Policy Number  [id number]  Group Number  [id number]  Insurance Contact for Provider  [phone]  Policy Ovalle  Is the patient the Insurance Policy ovalle/subscriber?  No, someone else is the policy ovalle  Policy Ovalle Details  Relationship with Policy Ovalle  Spouse  Policy [place]le Full Name  Aguilar Villeda  Date of Birth  04/01/1970  Callback Preferences  Could you share your preferred callback time with us?  No, I don't have a preference

## 2024-12-21 ENCOUNTER — OFFICE VISIT (OUTPATIENT)
Dept: URGENT CARE | Facility: URGENT CARE | Age: 74
End: 2024-12-21
Payer: MEDICARE

## 2024-12-21 VITALS
BODY MASS INDEX: 24.86 KG/M2 | OXYGEN SATURATION: 99 % | SYSTOLIC BLOOD PRESSURE: 143 MMHG | RESPIRATION RATE: 16 BRPM | DIASTOLIC BLOOD PRESSURE: 65 MMHG | WEIGHT: 154 LBS | HEART RATE: 60 BPM

## 2024-12-21 DIAGNOSIS — R39.9 URINARY SYMPTOM OR SIGN: ICD-10-CM

## 2024-12-21 DIAGNOSIS — N30.00 ACUTE CYSTITIS WITHOUT HEMATURIA: Primary | ICD-10-CM

## 2024-12-21 LAB
ALBUMIN UR-MCNC: NEGATIVE MG/DL
APPEARANCE UR: CLEAR
BILIRUB UR QL STRIP: NEGATIVE
COLOR UR AUTO: YELLOW
GLUCOSE UR STRIP-MCNC: NEGATIVE MG/DL
HGB UR QL STRIP: NEGATIVE
KETONES UR STRIP-MCNC: NEGATIVE MG/DL
LEUKOCYTE ESTERASE UR QL STRIP: NEGATIVE
NITRATE UR QL: NEGATIVE
PH UR STRIP: 7 [PH] (ref 5–7)
SP GR UR STRIP: 1.02 (ref 1–1.03)
UROBILINOGEN UR STRIP-ACNC: 0.2 E.U./DL

## 2024-12-21 PROCEDURE — 87086 URINE CULTURE/COLONY COUNT: CPT | Performed by: NURSE PRACTITIONER

## 2024-12-21 PROCEDURE — 99213 OFFICE O/P EST LOW 20 MIN: CPT | Performed by: NURSE PRACTITIONER

## 2024-12-21 PROCEDURE — 81003 URINALYSIS AUTO W/O SCOPE: CPT

## 2024-12-21 RX ORDER — MULTIVIT WITH MINERALS/LUTEIN
1000 TABLET ORAL DAILY
COMMUNITY

## 2024-12-21 RX ORDER — ACETAMINOPHEN 500 MG
TABLET ORAL
COMMUNITY
Start: 2023-06-14

## 2024-12-21 RX ORDER — METHYLPHENIDATE HYDROCHLORIDE 5 MG/1
TABLET ORAL
COMMUNITY

## 2024-12-21 RX ORDER — LISINOPRIL 10 MG/1
TABLET ORAL
COMMUNITY
Start: 2023-09-11

## 2024-12-21 RX ORDER — CHOLECALCIFEROL (VITAMIN D3) 125 MCG
800 CAPSULE ORAL DAILY
COMMUNITY

## 2024-12-21 RX ORDER — QUETIAPINE FUMARATE 50 MG/1
TABLET, FILM COATED ORAL
COMMUNITY
Start: 2024-12-18

## 2024-12-21 RX ORDER — MAGNESIUM GLYCINATE 100 MG
CAPSULE ORAL
COMMUNITY
Start: 2023-12-01

## 2024-12-21 RX ORDER — CEPHALEXIN 500 MG/1
500 CAPSULE ORAL 2 TIMES DAILY
Qty: 10 CAPSULE | Refills: 0 | Status: SHIPPED | OUTPATIENT
Start: 2024-12-21 | End: 2024-12-26

## 2024-12-21 RX ORDER — LAMOTRIGINE 100 MG/1
1 TABLET ORAL DAILY
COMMUNITY
Start: 2023-10-02

## 2024-12-21 ASSESSMENT — ENCOUNTER SYMPTOMS
CHILLS: 0
RESPIRATORY NEGATIVE: 1
VOMITING: 0
DYSURIA: 1
FLANK PAIN: 0
FEVER: 0
ABDOMINAL PAIN: 1
NAUSEA: 0
DYSPHORIC MOOD: 1
CARDIOVASCULAR NEGATIVE: 1
FREQUENCY: 1
EYES NEGATIVE: 1
DIARRHEA: 0

## 2024-12-21 NOTE — PATIENT INSTRUCTIONS
Take antibiotic as directed. Tylenol as needed for fever or discomfort. May trial OTC Pyridium as needed. Adequate fluids. Monitor for new or worsening symptoms.     You will be treated with cephalexin 500 mg twice daily for 5 days until cultures return and will adjust as necessary.     If symptoms worsen please return for further evaluation.

## 2024-12-21 NOTE — PROGRESS NOTES
Assessment & Plan       ICD-10-CM    1. Acute cystitis without hematuria  N30.00 cephALEXin (KEFLEX) 500 MG capsule      2. Urinary symptom or sign  R39.9 UA Macroscopic with reflex to Microscopic and Culture - Clinic Collect     Urine Culture Aerobic Bacterial - lab collect     Urine Culture Aerobic Bacterial - lab collect      Urine culture pending.     Patient Instructions   Take antibiotic as directed. Tylenol as needed for fever or discomfort. May trial OTC Pyridium as needed. Adequate fluids. Monitor for new or worsening symptoms.     You will be treated with cephalexin 500 mg twice daily for 5 days until cultures return and will adjust as necessary.     If symptoms worsen then go to Emergency Department.         Follow up with any problems, questions or concerns or if symptoms worsen or fail to improve. Patient agreed to the treatment plan and verbalized understanding.     Results for orders placed or performed in visit on 12/21/24 (from the past 24 hours)   UA Macroscopic with reflex to Microscopic and Culture - Clinic Collect    Specimen: Urine, Clean Catch   Result Value Ref Range    Color Urine Yellow Colorless, Straw, Light Yellow, Yellow    Appearance Urine Clear Clear    Glucose Urine Negative Negative mg/dL    Bilirubin Urine Negative Negative    Ketones Urine Negative Negative mg/dL    Specific Gravity Urine 1.020 1.003 - 1.035    Blood Urine Negative Negative    pH Urine 7.0 5.0 - 7.0    Protein Albumin Urine Negative Negative mg/dL    Urobilinogen Urine 0.2 0.2, 1.0 E.U./dL    Nitrite Urine Negative Negative    Leukocyte Esterase Urine Negative Negative    Narrative    Microscopic not indicated       Subjective     Shahrzad is a 74 year old female who presents to clinic today for the following health issues:  Chief Complaint   Patient presents with    Urgent Care    UTI     Pt reports recurrent UTI's. Pt reports urinary frequency, aches in lower abdomen and irritated urethra onset few weeks  positive  for nitrates did a urine test at home   Had a telehealth visit on 11/29/24 - Prescribed Nitrofurantoin and Phenazopyridine   Appt for Urology on feb 17 th     UTI  Associated symptoms include abdominal pain (mild medial abdominal discomfort). Pertinent negatives include no chills, fever, nausea or vomiting.     Patient states that she has been having increased urinary frequency and dysuria over the last week or 2.  She states she has had mild medial abdominal discomfort.  She states she does have recurrent UTIs and she was last treated on 11/29/2024.  At that time she was prescribed Macrobid and phenazopyridine.  She does have an appointment with urology on 2/17/2025. Patient is having banding of hemorrhoids and had last one done this past Tuesday, 12/17/2024.  She felt she recovered well from that. Patient will be following up in Nephrology early January 2025 at [facility].      Review of Systems   Constitutional:  Negative for chills and fever.   HENT: Negative.     Eyes: Negative.    Respiratory: Negative.     Cardiovascular: Negative.    Gastrointestinal:  Positive for abdominal pain (mild medial abdominal discomfort). Negative for diarrhea, nausea and vomiting.   Genitourinary:  Positive for dysuria, frequency and urgency. Negative for flank pain, vaginal discharge and vaginal pain.   Psychiatric/Behavioral:  Positive for dysphoric mood.        Problem List:  2023-04: Primary hyperparathyroidism (H)  2023-04: Stage 3 chronic kidney disease, unspecified whether stage 3a   or 3b CKD (H)  2018-02: Status post total knee replacement, right  2014-12: Pain in limb  2014-12: Other postprocedural status(V45.89)  2014-12: Knee pain  2010-10: Knee pain  2009-07: Arthralgia of temporomandibular joint  2009-07: Cervicalgia  2009-07: Spasm of muscle  2007-02: Pain in joint, shoulder region  2007-02: Disorder of bursae and tendons in shoulder region      Past Medical History:   Diagnosis Date    HTN (hypertension)   "        Social History     Tobacco Use    Smoking status: Never    Smokeless tobacco: Never   Substance Use Topics    Alcohol use: Yes     Comment: cocktail rarely           Objective    BP (!) 143/65 (BP Location: Left arm, Patient Position: Sitting, Cuff Size: Adult Regular)   Pulse 60   Resp 16   Wt 69.9 kg (154 lb)   HC 98.1 cm (38.62\")   SpO2 99%   Breastfeeding No   BMI 24.86 kg/m    Physical Exam  Vitals and nursing note reviewed.   Constitutional:       General: She is not in acute distress.     Appearance: Normal appearance.   HENT:      Head: Normocephalic and atraumatic.   Cardiovascular:      Rate and Rhythm: Normal rate and regular rhythm.      Heart sounds: Normal heart sounds.   Pulmonary:      Effort: Pulmonary effort is normal.      Breath sounds: Normal breath sounds.   Abdominal:      General: Abdomen is flat. Bowel sounds are normal.      Palpations: Abdomen is soft.      Tenderness: There is no abdominal tenderness. There is no right CVA tenderness or left CVA tenderness.   Neurological:      Mental Status: She is alert and oriented to person, place, and time.              Teresa Nuñez NP    "

## 2024-12-22 LAB — BACTERIA UR CULT: NO GROWTH

## 2025-01-31 ENCOUNTER — OFFICE VISIT (OUTPATIENT)
Dept: FAMILY MEDICINE | Facility: CLINIC | Age: 75
End: 2025-01-31
Payer: MEDICARE

## 2025-01-31 VITALS
TEMPERATURE: 98.6 F | DIASTOLIC BLOOD PRESSURE: 63 MMHG | HEART RATE: 95 BPM | BODY MASS INDEX: 24.16 KG/M2 | OXYGEN SATURATION: 100 % | SYSTOLIC BLOOD PRESSURE: 100 MMHG | RESPIRATION RATE: 16 BRPM | HEIGHT: 65 IN | WEIGHT: 145 LBS

## 2025-01-31 DIAGNOSIS — Z76.89 ENCOUNTER TO ESTABLISH CARE: Primary | ICD-10-CM

## 2025-01-31 DIAGNOSIS — I10 PRIMARY HYPERTENSION: ICD-10-CM

## 2025-01-31 DIAGNOSIS — R30.0 BURNING WITH URINATION: ICD-10-CM

## 2025-01-31 DIAGNOSIS — N18.30 STAGE 3 CHRONIC KIDNEY DISEASE, UNSPECIFIED WHETHER STAGE 3A OR 3B CKD (H): ICD-10-CM

## 2025-01-31 DIAGNOSIS — F32.A DEPRESSION, UNSPECIFIED DEPRESSION TYPE: ICD-10-CM

## 2025-01-31 DIAGNOSIS — F31.81 BIPOLAR 2 DISORDER (H): ICD-10-CM

## 2025-01-31 DIAGNOSIS — R35.0 URINARY FREQUENCY: ICD-10-CM

## 2025-01-31 DIAGNOSIS — F41.9 ANXIETY: ICD-10-CM

## 2025-01-31 DIAGNOSIS — E66.3 OVERWEIGHT: ICD-10-CM

## 2025-01-31 PROCEDURE — 99214 OFFICE O/P EST MOD 30 MIN: CPT

## 2025-01-31 RX ORDER — PROPRANOLOL HYDROCHLORIDE 10 MG/1
TABLET ORAL
COMMUNITY
Start: 2024-10-04 | End: 2025-01-31

## 2025-01-31 RX ORDER — DEXTROAMPHETAMINE SULFATE 5 MG/1
1 TABLET ORAL DAILY
COMMUNITY
End: 2025-01-31

## 2025-01-31 RX ORDER — TRAMADOL HYDROCHLORIDE 50 MG/1
TABLET ORAL
COMMUNITY
Start: 2024-09-04 | End: 2025-01-31

## 2025-01-31 RX ORDER — ESZOPICLONE 2 MG/1
TABLET, FILM COATED ORAL
COMMUNITY
Start: 2024-03-27 | End: 2025-01-31

## 2025-01-31 RX ORDER — CYANOCOBALAMIN 1000 UG/ML
1000 INJECTION, SOLUTION INTRAMUSCULAR; SUBCUTANEOUS WEEKLY
COMMUNITY

## 2025-01-31 RX ORDER — CRANBERRY FRUIT EXTRACT 200 MG
CAPSULE ORAL
COMMUNITY
Start: 2023-04-01

## 2025-01-31 RX ORDER — HYDROXYZINE PAMOATE 25 MG/1
25 CAPSULE ORAL
COMMUNITY
End: 2025-01-31

## 2025-01-31 RX ORDER — DOXYCYCLINE HYCLATE 100 MG
1 TABLET ORAL
COMMUNITY
Start: 2024-10-16 | End: 2025-01-31

## 2025-01-31 RX ORDER — PHENAZOPYRIDINE HYDROCHLORIDE 200 MG/1
TABLET, FILM COATED ORAL
COMMUNITY
Start: 2024-11-29 | End: 2025-01-31

## 2025-01-31 RX ORDER — MULTIVIT WITH MINERALS/LUTEIN
2000 TABLET ORAL DAILY
COMMUNITY

## 2025-01-31 RX ORDER — FLUCONAZOLE 150 MG/1
TABLET ORAL
COMMUNITY
Start: 2024-11-29 | End: 2025-01-31

## 2025-01-31 RX ORDER — BENZTROPINE MESYLATE 1 MG/1
1 TABLET ORAL
COMMUNITY
End: 2025-01-31

## 2025-01-31 RX ORDER — CLONIDINE HYDROCHLORIDE 0.1 MG/1
1 TABLET, EXTENDED RELEASE ORAL
COMMUNITY
Start: 2024-04-23 | End: 2025-01-31

## 2025-01-31 RX ORDER — NITROFURANTOIN 25; 75 MG/1; MG/1
CAPSULE ORAL
COMMUNITY
Start: 2024-11-29 | End: 2025-01-31

## 2025-01-31 RX ORDER — CITALOPRAM HYDROBROMIDE 10 MG/1
10 TABLET ORAL DAILY
COMMUNITY
End: 2025-01-31

## 2025-01-31 RX ORDER — GLUCOSA SU 2KCL/CHONDROITIN SU 500-400 MG
1 CAPSULE ORAL DAILY
COMMUNITY

## 2025-01-31 RX ORDER — ESZOPICLONE 3 MG/1
3 TABLET, FILM COATED ORAL
COMMUNITY
Start: 2024-07-19 | End: 2025-01-31

## 2025-01-31 RX ORDER — LANOLIN ALCOHOL/MO/W.PET/CERES
2 CREAM (GRAM) TOPICAL
COMMUNITY
End: 2025-01-31

## 2025-01-31 RX ORDER — SEMAGLUTIDE 1 MG/.5ML
INJECTION, SOLUTION SUBCUTANEOUS
COMMUNITY
Start: 2024-12-17 | End: 2025-01-31

## 2025-01-31 ASSESSMENT — PAIN SCALES - GENERAL: PAINLEVEL_OUTOF10: SEVERE PAIN (7)

## 2025-01-31 NOTE — PROGRESS NOTES
Assessment & Plan     Encounter to establish care  Moved back to [place] from [place], looking to establish care. Reviewed medical history and medications    Stage 3 chronic kidney disease, unspecified whether stage 3a or 3b CKD (H)  Follows closely with nephrology, reviewed labs and OV note from 1/7/2025. On lisinopril and vitamin d supplementation. Has upcoming kidney biopsy. Reports no indication for SGLT2-I at this time.     Bipolar 2 disorder (H)  Depression, unspecified depression type  Anxiety  Stable, on lamotrigine and seroquel for mood stabilizer. Follows with psychiatry at [facility]    Primary hypertension  Under good control on lisinopril, continue    Overweight  Contributing to HTN, has lost 20# within the past year on semaglutide. Would like to continue    Urinary frequency  Burning with urination  Reports has upcoming appointment with urology for symptom management. On vaginal estradiol    FUTURE APPOINTMENTS:       - Follow-up for annual visit or as needed  Regular exercise    Alicia Markham is a 74 year old, presenting for the following health issues:  Establish Care and Forms (Collect auth  from [employer])        1/31/2025     2:33 PM   Additional Questions   Roomed by Tyler     Here to establish care  Moved back from [place] in 2022, looking to get established with new PCP  Has upcoming appt with urology for bladder symptoms (frequent urination, mild burning)  Follows with [facility] for psychiatry/med management  Lost 20# intentionally in the last year, on semaglutide    History of Present Illness       Reason for visit:  Chronic watery nasal drainage during exercise.  Establish ongoing medical provider for genera health conditions.    She eats 2-3 servings of fruits and vegetables daily.She consumes 0 sweetened beverage(s) daily.She exercises with enough effort to increase her heart rate 60 or more minutes per day.  She exercises with enough  "effort to increase her heart rate 4 days per week.   She is taking medications regularly.                  Review of Systems  Constitutional, HEENT, cardiovascular, pulmonary, gi and gu systems are negative, except as otherwise noted.      Objective    /63 (BP Location: Left arm, Patient Position: Sitting, Cuff Size: Adult Regular)   Pulse 95   Temp 98.6  F (37  C) (Oral)   Resp 16   Ht 1.64 m (5' 4.57\")   Wt 65.8 kg (145 lb)   LMP  (LMP Unknown)   SpO2 100%   Breastfeeding No   BMI 24.45 kg/m    Body mass index is 24.45 kg/m .  Physical Exam  Vitals reviewed.   Constitutional:       Appearance: Normal appearance.   HENT:      Head: Normocephalic.   Eyes:      Pupils: Pupils are equal, round, and reactive to light.   Cardiovascular:      Rate and Rhythm: Normal rate.   Pulmonary:      Effort: Pulmonary effort is normal. No respiratory distress.   Musculoskeletal:         General: Normal range of motion.   Skin:     General: Skin is warm.   Neurological:      Mental Status: She is alert and oriented to person, place, and time.   Psychiatric:         Mood and Affect: Mood normal.         Behavior: Behavior normal.         Thought Content: Thought content normal.         Judgment: Judgment normal.               30 minutes spent on the date of encounter doing chart review, history and exam, documentation, and further activities per the note.          Signed Electronically by: Kim Mcgraw, FLACA, APRN, CNP    "

## 2025-02-03 ENCOUNTER — OFFICE VISIT (OUTPATIENT)
Dept: FAMILY MEDICINE | Facility: CLINIC | Age: 75
End: 2025-02-03
Payer: MEDICARE

## 2025-02-03 VITALS
RESPIRATION RATE: 16 BRPM | BODY MASS INDEX: 24.51 KG/M2 | OXYGEN SATURATION: 99 % | HEART RATE: 59 BPM | HEIGHT: 65 IN | TEMPERATURE: 98.2 F | SYSTOLIC BLOOD PRESSURE: 116 MMHG | WEIGHT: 147.1 LBS | DIASTOLIC BLOOD PRESSURE: 59 MMHG

## 2025-02-03 DIAGNOSIS — Z78.0 MENOPAUSE: ICD-10-CM

## 2025-02-03 DIAGNOSIS — R09.81 SINUS CONGESTION: Primary | ICD-10-CM

## 2025-02-03 DIAGNOSIS — J06.9 ACUTE UPPER RESPIRATORY INFECTION, UNSPECIFIED: ICD-10-CM

## 2025-02-03 LAB
ESTRADIOL SERPL-MCNC: 19 PG/ML
FLUAV RNA SPEC QL NAA+PROBE: NEGATIVE
FLUBV RNA RESP QL NAA+PROBE: NEGATIVE
RSV RNA SPEC NAA+PROBE: NEGATIVE
SARS-COV-2 RNA RESP QL NAA+PROBE: NEGATIVE
SHBG SERPL-SCNC: 41 NMOL/L (ref 30–135)

## 2025-02-03 PROCEDURE — 36415 COLL VENOUS BLD VENIPUNCTURE: CPT | Performed by: NURSE PRACTITIONER

## 2025-02-03 PROCEDURE — 82670 ASSAY OF TOTAL ESTRADIOL: CPT | Performed by: NURSE PRACTITIONER

## 2025-02-03 PROCEDURE — 87637 SARSCOV2&INF A&B&RSV AMP PRB: CPT | Performed by: NURSE PRACTITIONER

## 2025-02-03 PROCEDURE — 84270 ASSAY OF SEX HORMONE GLOBUL: CPT | Performed by: NURSE PRACTITIONER

## 2025-02-03 PROCEDURE — 99215 OFFICE O/P EST HI 40 MIN: CPT | Performed by: NURSE PRACTITIONER

## 2025-02-03 NOTE — PROGRESS NOTES
"  Assessment & Plan     Sinus congestion  Will screen for viral etiology. Add Xlear nasal spray. Send message if worsening symptoms.   - Influenza A/B, RSV and SARS-CoV2 PCR (COVID-19); Future  - Influenza A/B, RSV and SARS-CoV2 PCR (COVID-19) Nasopharyngeal    Acute upper respiratory infection, unspecified  - Influenza A/B, RSV and SARS-CoV2 PCR (COVID-19); Future  - Influenza A/B, RSV and SARS-CoV2 PCR (COVID-19) Nasopharyngeal    Menopause  Being treated with HRT. Will check levels   - Estradiol; Future  - Testosterone Free and Total; Future  - Estradiol  - Testosterone Free and Total          Subjective   Shahrzad is a 74 year old, presenting for the following health issues:  No chief complaint on file.    History of Present Illness       Headaches:   Since the patient's last clinic visit, headaches are: worsened  The patient is getting headaches:  Rarely  She is not able to do normal daily activities when she has a migraine.  The patient is taking the following rescue/relief medications:  No rescue/relief medications   Patient states \"I get no relief\" from the rescue/relief medications.   The patient is taking the following medications to prevent migraines:  No medications to prevent migraines  In the past 4 weeks, the patient has gone to an Urgent Care or Emergency Room 0 times times due to headaches.   She is taking medications regularly.       Couple months of nose running like water  Eventually started feeling throbbing of sinuses   Covid test 3 weeks ago negative   No cough,  Temp 99.4 last night   Very slight dizziness  No ear pain, sore throat. Ears do feel a little full     Got a new CPAP mask last week   Was told that the watery nose was related to the CPAP    Is on hormones for menopausal hot flashes   Has been on hormones for at least 10 years   Tried going off in 2020 and got hot flashes again so restarted       Review of Systems  Detailed as above         Objective    /59 (BP Location: Left arm, " "Patient Position: Sitting, Cuff Size: Adult Regular)   Pulse 59   Temp 98.2  F (36.8  C)   Resp 16   Ht 1.64 m (5' 4.57\")   Wt 66.7 kg (147 lb 1.6 oz)   LMP  (LMP Unknown)   SpO2 99%   BMI 24.81 kg/m    There is no height or weight on file to calculate BMI.  Physical Exam  Constitutional:       Appearance: Normal appearance.   HENT:      Head: Normocephalic.      Right Ear: Tympanic membrane, ear canal and external ear normal.      Left Ear: Tympanic membrane, ear canal and external ear normal.      Mouth/Throat:      Mouth: Mucous membranes are moist.   Eyes:      Conjunctiva/sclera: Conjunctivae normal.   Cardiovascular:      Rate and Rhythm: Normal rate and regular rhythm.      Heart sounds: Normal heart sounds. No murmur heard.  Pulmonary:      Effort: Pulmonary effort is normal. No respiratory distress.      Breath sounds: Normal breath sounds.   Skin:     General: Skin is warm and dry.   Neurological:      Mental Status: She is alert.   Psychiatric:         Mood and Affect: Mood normal.          45 minutes spent on the date of the encounter doing chart review, history and exam, documentation and further activities as noted above         Signed Electronically by: NATALIE Hunt CNP    "

## 2025-02-03 NOTE — PATIENT INSTRUCTIONS
Xlear nasal spray (regular with saline). This has grapefruit seed extract that is a natural antimicrobial. This helps fight off any sort of infection in the nose. It also has xylitol that lines the mucus membranes so that viruses and bacteria can stop and take hold.    Used it every few hours initially while you are sick. Then go to twice a day  It can also be used prophylactically. If you are getting sick or around anyone that sick you can use it twice a day.

## 2025-02-04 ENCOUNTER — TELEPHONE (OUTPATIENT)
Dept: UROLOGY | Facility: CLINIC | Age: 75
End: 2025-02-04
Payer: MEDICARE

## 2025-02-04 DIAGNOSIS — N39.0 RECURRENT UTI: Primary | ICD-10-CM

## 2025-02-04 NOTE — TELEPHONE ENCOUNTER
Patient is scheduled with Alisa Zabala PA-C on 2/17/25 at 3:15 PM .     Patient is now scheduled a lab appointment to complete a urine sample on 2/17/25 at 2:45 PM .     UA/UC pended will be signed closer to patient scheduled appointment. Routed to the pool. Message delayed closer to patient appointment.    Mychart sent to patient informing them of the lab appointment for the urine sample.     Josefina Foley on 2/4/2025 at 1:51 PM    Future Appointments 2/4/2025 - 8/3/2025        Date Visit Type Length Department Provider     2/17/2025  2:45 PM LAB 15 min [facility] FIRST FLOOR [facility]    Location Instructions:     The clinic is located at [address] in [place].&nbsp; We offer free parking in our on-site lot.              2/17/2025  3:15 PM NEW UROLOGY 45 min [facility] Alisa Zabala PA-C

## 2025-02-04 NOTE — RESULT ENCOUNTER NOTE
The estrogen is at an ok level. I like to aim for 50 to see adequate benefit from estrogen. The testosterone can take up to a week or more to return. I'll comment when I see it.

## 2025-02-10 NOTE — TELEPHONE ENCOUNTER
Future UA/UC ordered per protocol in preparation for upcoming appointment with Alisa Zabala PA-C.    Naila Bennett RN, BSN

## 2025-02-21 ENCOUNTER — HOSPITAL ENCOUNTER (OUTPATIENT)
Dept: MAMMOGRAPHY | Facility: CLINIC | Age: 75
Discharge: HOME | End: 2025-02-21
Payer: MEDICARE

## 2025-02-21 DIAGNOSIS — R92.8 ABNORMAL MAMMOGRAM: ICD-10-CM

## 2025-02-21 PROCEDURE — 77061 BREAST TOMOSYNTHESIS UNI: CPT | Mod: LT

## 2025-02-21 PROCEDURE — 77065 DX MAMMO INCL CAD UNI: CPT | Mod: LT

## 2025-02-21 PROCEDURE — 76642 ULTRASOUND BREAST LIMITED: CPT | Mod: LT

## 2025-03-11 ENCOUNTER — TRANSFERRED RECORDS (OUTPATIENT)
Dept: HEALTH INFORMATION MANAGEMENT | Facility: CLINIC | Age: 75
End: 2025-03-11
Payer: MEDICARE

## 2025-03-25 ENCOUNTER — TRANSFERRED RECORDS (OUTPATIENT)
Dept: HEALTH INFORMATION MANAGEMENT | Facility: CLINIC | Age: 75
End: 2025-03-25
Payer: MEDICARE

## 2025-04-12 ENCOUNTER — APPOINTMENT (OUTPATIENT)
Dept: CT IMAGING | Facility: CLINIC | Age: 75
End: 2025-04-12
Attending: EMERGENCY MEDICINE
Payer: COMMERCIAL

## 2025-04-12 ENCOUNTER — HOSPITAL ENCOUNTER (EMERGENCY)
Facility: CLINIC | Age: 75
Discharge: HOME | End: 2025-04-12
Attending: EMERGENCY MEDICINE | Admitting: EMERGENCY MEDICINE
Payer: COMMERCIAL

## 2025-04-12 VITALS
OXYGEN SATURATION: 99 % | HEART RATE: 62 BPM | DIASTOLIC BLOOD PRESSURE: 76 MMHG | SYSTOLIC BLOOD PRESSURE: 133 MMHG | RESPIRATION RATE: 18 BRPM | TEMPERATURE: 97.8 F

## 2025-04-12 DIAGNOSIS — R07.89 CHEST WALL PAIN: ICD-10-CM
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
## 2025-04-12 DIAGNOSIS — S16.1XXA CERVICAL STRAIN, INITIAL ENCOUNTER: ICD-10-CM

## 2025-04-12 PROCEDURE — 99284 EMERGENCY DEPT VISIT MOD MDM: CPT | Mod: 25

## 2025-04-12 PROCEDURE — 72125 CT NECK SPINE W/O DYE: CPT

## 2025-04-12 PROCEDURE — 71250 CT THORAX DX C-: CPT

## 2025-04-12 ASSESSMENT — ACTIVITIES OF DAILY LIVING (ADL)
ADLS_ACUITY_SCORE: 44

## 2025-04-12 ASSESSMENT — COLUMBIA-SUICIDE SEVERITY RATING SCALE - C-SSRS
1. IN THE PAST MONTH, HAVE YOU WISHED YOU WERE DEAD OR WISHED YOU COULD GO TO SLEEP AND NOT WAKE UP?: NO
6. HAVE YOU EVER DONE ANYTHING, STARTED TO DO ANYTHING, OR PREPARED TO DO ANYTHING TO END YOUR LIFE?: NO
2. HAVE YOU ACTUALLY HAD ANY THOUGHTS OF KILLING YOURSELF IN THE PAST MONTH?: NO
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
## 2025-04-12 NOTE — DISCHARGE INSTRUCTIONS
Ice areas of pain 20 minutes 4 times per day for the neck several days  Tylenol and/or ibuprofen as needed for pain control
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
## 2025-04-14 ENCOUNTER — PATIENT OUTREACH (OUTPATIENT)
Dept: FAMILY MEDICINE | Facility: CLINIC | Age: 75
End: 2025-04-14
Payer: MEDICARE
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
## 2025-04-21 ENCOUNTER — OFFICE VISIT (OUTPATIENT)
Dept: FAMILY MEDICINE | Facility: CLINIC | Age: 75
End: 2025-04-21
Payer: COMMERCIAL

## 2025-04-21 VITALS
HEART RATE: 71 BPM | RESPIRATION RATE: 16 BRPM | DIASTOLIC BLOOD PRESSURE: 60 MMHG | HEIGHT: 65 IN | SYSTOLIC BLOOD PRESSURE: 127 MMHG | TEMPERATURE: 97.7 F | OXYGEN SATURATION: 99 % | WEIGHT: 141 LBS | BODY MASS INDEX: 23.49 KG/M2

## 2025-04-21 DIAGNOSIS — R07.89 CHEST WALL PAIN: ICD-10-CM

## 2025-04-21 DIAGNOSIS — Z87.828 HISTORY OF MOTOR VEHICLE ACCIDENT: Primary | ICD-10-CM

## 2025-04-21 PROCEDURE — 99212 OFFICE O/P EST SF 10 MIN: CPT | Performed by: NURSE PRACTITIONER

## 2025-04-21 ASSESSMENT — PAIN SCALES - GENERAL: PAINLEVEL_OUTOF10: NO PAIN (0)
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
## 2025-06-15 ENCOUNTER — HEALTH MAINTENANCE LETTER (OUTPATIENT)
Age: 75
End: 2025-06-15

## 2025-07-23 ENCOUNTER — OFFICE VISIT (OUTPATIENT)
Dept: URGENT CARE | Facility: URGENT CARE | Age: 75
End: 2025-07-23
Payer: MEDICARE

## 2025-07-23 VITALS
WEIGHT: 139 LBS | TEMPERATURE: 98.9 F | DIASTOLIC BLOOD PRESSURE: 68 MMHG | SYSTOLIC BLOOD PRESSURE: 121 MMHG | RESPIRATION RATE: 16 BRPM | BODY MASS INDEX: 23.44 KG/M2 | HEART RATE: 77 BPM | OXYGEN SATURATION: 98 %

## 2025-07-23 DIAGNOSIS — R35.0 URINARY FREQUENCY: ICD-10-CM

## 2025-07-23 DIAGNOSIS — N18.30 STAGE 3 CHRONIC KIDNEY DISEASE, UNSPECIFIED WHETHER STAGE 3A OR 3B CKD (H): ICD-10-CM

## 2025-07-23 DIAGNOSIS — N30.01 ACUTE CYSTITIS WITH HEMATURIA: Primary | ICD-10-CM

## 2025-07-23 LAB
ALBUMIN UR-MCNC: ABNORMAL MG/DL
APPEARANCE UR: CLEAR
BACTERIA #/AREA URNS HPF: ABNORMAL /HPF
BILIRUB UR QL STRIP: NEGATIVE
COLOR UR AUTO: YELLOW
GLUCOSE UR STRIP-MCNC: NEGATIVE MG/DL
HGB UR QL STRIP: ABNORMAL
KETONES UR STRIP-MCNC: NEGATIVE MG/DL
LEUKOCYTE ESTERASE UR QL STRIP: ABNORMAL
NITRATE UR QL: POSITIVE
PH UR STRIP: 7 [PH] (ref 5–7)
RBC #/AREA URNS AUTO: ABNORMAL /HPF
SP GR UR STRIP: 1.01 (ref 1–1.03)
SQUAMOUS #/AREA URNS AUTO: ABNORMAL /LPF
UROBILINOGEN UR STRIP-ACNC: 0.2 E.U./DL
WBC #/AREA URNS AUTO: >100 /HPF

## 2025-07-23 PROCEDURE — 99214 OFFICE O/P EST MOD 30 MIN: CPT | Performed by: NURSE PRACTITIONER

## 2025-07-23 PROCEDURE — 87186 SC STD MICRODIL/AGAR DIL: CPT | Performed by: NURSE PRACTITIONER

## 2025-07-23 PROCEDURE — 81001 URINALYSIS AUTO W/SCOPE: CPT | Performed by: NURSE PRACTITIONER

## 2025-07-23 PROCEDURE — 87086 URINE CULTURE/COLONY COUNT: CPT | Performed by: NURSE PRACTITIONER

## 2025-07-23 PROCEDURE — 3074F SYST BP LT 130 MM HG: CPT | Performed by: NURSE PRACTITIONER

## 2025-07-23 PROCEDURE — 3078F DIAST BP <80 MM HG: CPT | Performed by: NURSE PRACTITIONER

## 2025-07-23 PROCEDURE — 87088 URINE BACTERIA CULTURE: CPT | Performed by: NURSE PRACTITIONER

## 2025-07-23 RX ORDER — PHENAZOPYRIDINE HYDROCHLORIDE 100 MG/1
100 TABLET, FILM COATED ORAL 3 TIMES DAILY PRN
Qty: 9 TABLET | Refills: 0 | Status: SHIPPED | OUTPATIENT
Start: 2025-07-23 | End: 2025-07-26

## 2025-07-23 RX ORDER — NITROFURANTOIN 25; 75 MG/1; MG/1
100 CAPSULE ORAL 2 TIMES DAILY
Qty: 14 CAPSULE | Refills: 0 | Status: SHIPPED | OUTPATIENT
Start: 2025-07-23 | End: 2025-07-30

## 2025-07-23 RX ORDER — CEPHALEXIN 500 MG/1
500 CAPSULE ORAL 2 TIMES DAILY
Qty: 14 CAPSULE | Refills: 0 | Status: CANCELLED | OUTPATIENT
Start: 2025-07-23 | End: 2025-07-30

## 2025-07-23 NOTE — PROGRESS NOTES
Assessment & Plan     1. Urinary frequency    - UA Macroscopic with reflex to Microscopic and Culture - Clinic Collect  - Urine Microscopic Exam  - Urine Culture    2. Acute cystitis with hematuria (Primary)    - nitroFURantoin macrocrystal-monohydrate (MACROBID) 100 MG capsule; Take 1 capsule (100 mg) by mouth 2 times daily for 7 days.  Dispense: 14 capsule; Refill: 0  - phenazopyridine (PYRIDIUM) 100 MG tablet; Take 1 tablet (100 mg) by mouth 3 times daily as needed for urinary tract discomfort.  Dispense: 9 tablet; Refill: 0    3. Stage 3 chronic kidney disease, unspecified whether stage 3a or 3b CKD (H)    Stable     Urine culture pending patient aware that this may change course of antibiotic.  Discussed completing full course of oral antibiotic to take this with food to avoid GI upset and to push fluids.        [facility] ISABELL Markham is a 75 year old female who presents to clinic today for the following health issues:  Chief Complaint   Patient presents with    Dysuria     1 week, abdominal pressure         7/23/2025    10:11 AM   Additional Questions   Roomed by lyudmila rashid         7/23/2025    10:11 AM   Patient Reported Additional Medications   Patient reports taking the following new medications Tirzepatide/Niacinamide 20mg/25mg/2mL Biest 80/20 estradiol     HPI      Crcl 78 history of renal disease  And recurring UTIs patient is working with a nephrologist and urologist also going to PT for pelvic floor treatment    Symptoms of dysuria denies flank pain or fever.    Review of Systems  Constitutional, HEENT, cardiovascular, pulmonary, gi and gu systems are negative, except as otherwise noted.      Objective    /68 (BP Location: Left arm, Patient Position: Sitting, Cuff Size: Adult Regular)   Pulse 77   Temp 98.9  F (37.2  C) (Tympanic)   Resp 16   Wt 63 kg (139 lb)   LMP  (LMP Unknown)   SpO2 98%   BMI 23.44 kg/m    Physical Exam    GENERAL: alert and no distress  NECK: no adenopathy, no asymmetry, masses, or scars  RESP: lungs clear to auscultation - no rales, rhonchi or wheezes  CV: regular rate and rhythm, normal S1 S2, no S3 or S4, no murmur, click or rub, no peripheral edema  ABDOMEN: soft, nontender, no hepatosplenomegaly, no masses and bowel sounds normal  MS: no gross musculoskeletal defects noted, no edema    .thsi

## 2025-07-23 NOTE — PROGRESS NOTES
Urgent Care Clinic Visit    Chief Complaint   Patient presents with    Dysuria     1 week, abdominal pressure               7/23/2025    10:11 AM   Additional Questions   Roomed by lyudmila rashid         7/23/2025    10:11 AM   Patient Reported Additional Medications   Patient reports taking the following new medications Tirzepatide/Niacinamide 20mg/25mg/2mL Biest 80/20 estradiol     Pre-Provider Visit Orders- Urinalysis UA/UC  Patient reports the following symptoms:  discomfort, pain or burning with urination  and frequent urination   Does the patient report any of the following symptoms: vaginal discharge, vaginal itching, possible yeast infection, has a urinary catheter in place, or unable to void in a specimen cup?  No

## 2025-07-24 LAB — BACTERIA UR CULT: ABNORMAL

## (undated) DEVICE — MANIFOLD NEPTUNE 4 PORT 700-20

## (undated) DEVICE — SOL NACL 0.9% INJ 250ML BAG 2B1322Q

## (undated) DEVICE — SOLUTION WOUND CLEANSING 3/4OZ 10% PVP EA-L3011FB-50

## (undated) DEVICE — BLADE SAW RECIP STRK 70X12.5X1.2MM 0277-096-281

## (undated) DEVICE — GLOVE PROTEXIS POWDER FREE 8.5 ORTHOPEDIC 2D73ET85

## (undated) DEVICE — BONE CEMENT MIXEVAC III HI VAC KIT  0206-015-000

## (undated) DEVICE — SUCTION IRR SYSTEM W/O TIP INTERPULSE HANDPIECE 0210-100-000

## (undated) DEVICE — BONE CLEANING TIP INTERPULSE  0210-010-000

## (undated) DEVICE — DRAPE IOBAN INCISE 23X17" 6650EZ

## (undated) DEVICE — DRAPE SHEET REV FOLD 3/4 9349

## (undated) DEVICE — SU STRATAFIX PDS PLUS 0 CT 45CM SXPP1A406

## (undated) DEVICE — LINEN TOWEL PACK X5 5464

## (undated) DEVICE — CAST PADDING 6" UNSTERILE 9046

## (undated) DEVICE — GLOVE PROTEXIS W/NEU-THERA 6.5  2D73TE65

## (undated) DEVICE — SU VICRYL 2-0 CP-1 27" UND J266H

## (undated) DEVICE — BLADE SAW SAGITTAL STRK 19.5X95X1.27MM 2108-109-000S15

## (undated) DEVICE — SYR 50ML LL W/O NDL 309653

## (undated) DEVICE — NDL 22GA 1.5"

## (undated) DEVICE — GLOVE PROTEXIS BLUE W/NEU-THERA 7.0  2D73EB70

## (undated) DEVICE — PREP CHLORAPREP 26ML TINTED ORANGE  260815

## (undated) DEVICE — GLOVE PROTEXIS MICRO 6.5  2D73PM65

## (undated) DEVICE — WRAP EZY KNEE

## (undated) DEVICE — PACK TOTAL KNEE SOP15TKFSD

## (undated) DEVICE — BLADE CLIPPER 4412A

## (undated) DEVICE — ESU BIPOLAR SEALER AQUAMANTYS 6MM 23-112-1

## (undated) DEVICE — GLOVE PROTEXIS W/NEU-THERA 8.5  2D73TE85

## (undated) DEVICE — ESU PENCIL W/SMOKE EVAC NEPTUNE STRYKER 0703-046-000

## (undated) DEVICE — DRSG ABDOMINAL 07 1/2X8" 7197D

## (undated) DEVICE — Device

## (undated) DEVICE — DRAPE STERI U 1015

## (undated) DEVICE — ESU GROUND PAD UNIVERSAL W/O CORD

## (undated) DEVICE — SU MONOCRYL 3-0 PS-2 27" Y427H

## (undated) DEVICE — SU WND CLOSURE VLOC 180 ABS 2-0 24" GS-21 VLOCL0335

## (undated) DEVICE — SOL NACL 0.9% IRRIG 1000ML BOTTLE 07138-09

## (undated) DEVICE — SU VICRYL 0 CTX CR 8X18" J764D

## (undated) RX ORDER — ACETAMINOPHEN 500 MG
TABLET ORAL
Status: DISPENSED
Start: 2018-02-20

## (undated) RX ORDER — PROPOFOL 10 MG/ML
INJECTION, EMULSION INTRAVENOUS
Status: DISPENSED
Start: 2018-02-20

## (undated) RX ORDER — SCOLOPAMINE TRANSDERMAL SYSTEM 1 MG/1
PATCH, EXTENDED RELEASE TRANSDERMAL
Status: DISPENSED
Start: 2018-02-20

## (undated) RX ORDER — CEFAZOLIN SODIUM 1 G/3ML
INJECTION, POWDER, FOR SOLUTION INTRAMUSCULAR; INTRAVENOUS
Status: DISPENSED
Start: 2018-02-20

## (undated) RX ORDER — KETOROLAC TROMETHAMINE 15 MG/ML
INJECTION, SOLUTION INTRAMUSCULAR; INTRAVENOUS
Status: DISPENSED
Start: 2018-02-20

## (undated) RX ORDER — FENTANYL CITRATE 50 UG/ML
INJECTION, SOLUTION INTRAMUSCULAR; INTRAVENOUS
Status: DISPENSED
Start: 2018-02-20

## (undated) RX ORDER — CEFAZOLIN SODIUM 2 G/100ML
INJECTION, SOLUTION INTRAVENOUS
Status: DISPENSED
Start: 2018-02-20